# Patient Record
Sex: MALE | Race: OTHER | Employment: OTHER | URBAN - METROPOLITAN AREA
[De-identification: names, ages, dates, MRNs, and addresses within clinical notes are randomized per-mention and may not be internally consistent; named-entity substitution may affect disease eponyms.]

---

## 2017-02-21 ENCOUNTER — LAB ENCOUNTER (OUTPATIENT)
Dept: LAB | Age: 82
End: 2017-02-21
Attending: NURSE PRACTITIONER
Payer: COMMERCIAL

## 2017-02-21 DIAGNOSIS — I10 HTN (HYPERTENSION): ICD-10-CM

## 2017-02-21 DIAGNOSIS — R74.8 ACID PHOSPHATASE ELEVATED: ICD-10-CM

## 2017-02-21 DIAGNOSIS — E11.65 TYPE II DIABETES MELLITUS, UNCONTROLLED (HCC): Primary | ICD-10-CM

## 2017-02-21 LAB
ALBUMIN SERPL-MCNC: 3.8 G/DL (ref 3.5–4.8)
ALP LIVER SERPL-CCNC: 137 U/L (ref 45–117)
ALT SERPL-CCNC: 33 U/L (ref 17–63)
AST SERPL-CCNC: 30 U/L (ref 15–41)
BILIRUB SERPL-MCNC: 0.5 MG/DL (ref 0.1–2)
BUN BLD-MCNC: 42 MG/DL (ref 8–20)
CALCIUM BLD-MCNC: 9.2 MG/DL (ref 8.3–10.3)
CHLORIDE: 106 MMOL/L (ref 101–111)
CO2: 26 MMOL/L (ref 22–32)
CREAT BLD-MCNC: 1.64 MG/DL (ref 0.7–1.3)
EST. AVERAGE GLUCOSE BLD GHB EST-MCNC: 166 MG/DL (ref 68–126)
GLUCOSE BLD-MCNC: 94 MG/DL (ref 70–99)
HBA1C MFR BLD HPLC: 7.4 % (ref ?–5.7)
M PROTEIN MFR SERPL ELPH: 7.7 G/DL (ref 6.1–8.3)
POTASSIUM SERPL-SCNC: 3.9 MMOL/L (ref 3.6–5.1)
SODIUM SERPL-SCNC: 139 MMOL/L (ref 136–144)

## 2017-02-21 PROCEDURE — 80053 COMPREHEN METABOLIC PANEL: CPT

## 2017-02-21 PROCEDURE — 83036 HEMOGLOBIN GLYCOSYLATED A1C: CPT

## 2017-02-21 PROCEDURE — 36415 COLL VENOUS BLD VENIPUNCTURE: CPT

## 2017-03-07 ENCOUNTER — NURSE ONLY (OUTPATIENT)
Dept: ENDOCRINOLOGY CLINIC | Facility: CLINIC | Age: 82
End: 2017-03-07

## 2017-03-07 VITALS
WEIGHT: 188 LBS | HEART RATE: 85 BPM | SYSTOLIC BLOOD PRESSURE: 135 MMHG | BODY MASS INDEX: 32 KG/M2 | DIASTOLIC BLOOD PRESSURE: 72 MMHG

## 2017-03-07 DIAGNOSIS — E11.65 TYPE 2 DIABETES MELLITUS WITH HYPERGLYCEMIA, WITH LONG-TERM CURRENT USE OF INSULIN (HCC): Primary | ICD-10-CM

## 2017-03-07 DIAGNOSIS — Z79.4 TYPE 2 DIABETES MELLITUS WITH HYPERGLYCEMIA, WITH LONG-TERM CURRENT USE OF INSULIN (HCC): Primary | ICD-10-CM

## 2017-03-07 PROCEDURE — G0108 DIAB MANAGE TRN  PER INDIV: HCPCS

## 2017-03-08 NOTE — PROGRESS NOTES
Orlando Daniel  : 5/15/1935 was seen for Diabetic Follow up:     Date: 3/7/2017       Assessment:     Assessment: /72 mmHg  Pulse 85  Wt 188 lb       HEMOGLOBIN A1C (%)   Date Value   2015 >14   ----------  HGBA1C (%)   Date Value   / nutrition concepts for diabetes management. Reviewed (choice, carbohydrate control using the healthy plate method and food models.   Reviewed principles for heart healthy diet (low fate, low saturated fat, high fiber, reduced sodium)  Oth    Being Active

## 2017-04-04 ENCOUNTER — LAB ENCOUNTER (OUTPATIENT)
Dept: LAB | Age: 82
End: 2017-04-04
Attending: NURSE PRACTITIONER
Payer: COMMERCIAL

## 2017-04-04 DIAGNOSIS — E11.9 DIABETES MELLITUS (HCC): ICD-10-CM

## 2017-04-04 DIAGNOSIS — N18.30 CHRONIC KIDNEY DISEASE, STAGE III (MODERATE) (HCC): Primary | ICD-10-CM

## 2017-04-04 PROCEDURE — 36415 COLL VENOUS BLD VENIPUNCTURE: CPT

## 2017-04-04 PROCEDURE — 83036 HEMOGLOBIN GLYCOSYLATED A1C: CPT

## 2017-04-04 PROCEDURE — 80053 COMPREHEN METABOLIC PANEL: CPT

## 2017-06-06 ENCOUNTER — NURSE ONLY (OUTPATIENT)
Dept: ENDOCRINOLOGY CLINIC | Facility: CLINIC | Age: 82
End: 2017-06-06

## 2017-06-06 VITALS
HEART RATE: 83 BPM | BODY MASS INDEX: 32 KG/M2 | WEIGHT: 187 LBS | DIASTOLIC BLOOD PRESSURE: 51 MMHG | SYSTOLIC BLOOD PRESSURE: 89 MMHG

## 2017-06-06 DIAGNOSIS — E11.65 TYPE 2 DIABETES MELLITUS WITH HYPERGLYCEMIA, WITH LONG-TERM CURRENT USE OF INSULIN (HCC): Primary | ICD-10-CM

## 2017-06-06 DIAGNOSIS — Z79.4 TYPE 2 DIABETES MELLITUS WITH HYPERGLYCEMIA, WITH LONG-TERM CURRENT USE OF INSULIN (HCC): Primary | ICD-10-CM

## 2017-06-06 PROCEDURE — G0108 DIAB MANAGE TRN  PER INDIV: HCPCS

## 2017-06-06 NOTE — PROGRESS NOTES
Met with Missael Patricio for a 3 month follow up for his diabetes. He is testing BG levels before every meal.  All readings are within acceptable ranges and his average BG level for the last 2 weeks is 152.   He is injecting 40 units of Lantus insulin at HS and 1

## 2017-11-14 ENCOUNTER — NURSE ONLY (OUTPATIENT)
Dept: ENDOCRINOLOGY CLINIC | Facility: CLINIC | Age: 82
End: 2017-11-14

## 2017-11-14 VITALS
SYSTOLIC BLOOD PRESSURE: 117 MMHG | BODY MASS INDEX: 33.97 KG/M2 | HEIGHT: 64 IN | WEIGHT: 199 LBS | HEART RATE: 89 BPM | DIASTOLIC BLOOD PRESSURE: 64 MMHG

## 2017-11-14 DIAGNOSIS — E11.65 TYPE 2 DIABETES MELLITUS WITH HYPERGLYCEMIA, WITH LONG-TERM CURRENT USE OF INSULIN (HCC): Primary | ICD-10-CM

## 2017-11-14 DIAGNOSIS — Z79.4 TYPE 2 DIABETES MELLITUS WITH HYPERGLYCEMIA, WITH LONG-TERM CURRENT USE OF INSULIN (HCC): Primary | ICD-10-CM

## 2017-11-14 PROCEDURE — G0108 DIAB MANAGE TRN  PER INDIV: HCPCS

## 2017-11-15 NOTE — PROGRESS NOTES
Demarco Chong  : 5/15/1935 was seen for Diabetic Education Follow up:    Date: 2017       Assessment:     Assessment: /64   Pulse 89   Ht 64\"   Wt 199 lb   BMI 34.16 kg/m²        HEMOGLOBIN A1C (%)   Date Value   2015 >14   ------- Bilirubin, Total 0.6 0.1 - 2.0 mg/dL   Total Protein 7.6 6.1 - 8.3 g/dL   Albumin 3.8 3.5 - 4.8 g/dL   Sodium 139 136 - 144 mmol/L   Potassium 3.8 3.6 - 5.1 mmol/L   Chloride 103 101 - 111 mmol/L   CO2 26.0 22.0 - 32.0 mmol/L   -HEMOGLOBIN A1C   Result V

## 2017-11-30 ENCOUNTER — HOSPITAL ENCOUNTER (OUTPATIENT)
Dept: GENERAL RADIOLOGY | Age: 82
Discharge: HOME | End: 2017-11-30
Attending: NURSE PRACTITIONER
Payer: COMMERCIAL

## 2017-11-30 ENCOUNTER — LAB ENCOUNTER (OUTPATIENT)
Dept: LAB | Age: 82
End: 2017-11-30
Attending: NURSE PRACTITIONER
Payer: COMMERCIAL

## 2017-11-30 DIAGNOSIS — I10 ESSENTIAL HYPERTENSION, MALIGNANT: ICD-10-CM

## 2017-11-30 DIAGNOSIS — E11.9 DIABETES MELLITUS (HCC): ICD-10-CM

## 2017-11-30 DIAGNOSIS — M54.9 BACK PAIN: ICD-10-CM

## 2017-11-30 DIAGNOSIS — N18.30 CHRONIC KIDNEY DISEASE, STAGE III (MODERATE) (HCC): Primary | ICD-10-CM

## 2017-11-30 PROCEDURE — 36415 COLL VENOUS BLD VENIPUNCTURE: CPT

## 2017-11-30 PROCEDURE — 83036 HEMOGLOBIN GLYCOSYLATED A1C: CPT

## 2017-11-30 PROCEDURE — 80061 LIPID PANEL: CPT

## 2017-11-30 PROCEDURE — 80053 COMPREHEN METABOLIC PANEL: CPT

## 2017-11-30 PROCEDURE — 72072 X-RAY EXAM THORAC SPINE 3VWS: CPT | Performed by: NURSE PRACTITIONER

## 2017-11-30 PROCEDURE — 72110 X-RAY EXAM L-2 SPINE 4/>VWS: CPT | Performed by: NURSE PRACTITIONER

## 2018-02-20 ENCOUNTER — NURSE ONLY (OUTPATIENT)
Dept: ENDOCRINOLOGY CLINIC | Facility: CLINIC | Age: 83
End: 2018-02-20

## 2018-02-20 VITALS
DIASTOLIC BLOOD PRESSURE: 66 MMHG | BODY MASS INDEX: 32 KG/M2 | WEIGHT: 185 LBS | HEART RATE: 95 BPM | SYSTOLIC BLOOD PRESSURE: 129 MMHG

## 2018-02-20 DIAGNOSIS — E11.65 TYPE 2 DIABETES MELLITUS WITH HYPERGLYCEMIA, WITH LONG-TERM CURRENT USE OF INSULIN (HCC): Primary | ICD-10-CM

## 2018-02-20 DIAGNOSIS — Z79.4 TYPE 2 DIABETES MELLITUS WITH HYPERGLYCEMIA, WITH LONG-TERM CURRENT USE OF INSULIN (HCC): Primary | ICD-10-CM

## 2018-02-20 PROCEDURE — G0108 DIAB MANAGE TRN  PER INDIV: HCPCS

## 2018-02-21 NOTE — PROGRESS NOTES
Chelsiechris Tejada  : 5/15/1935 was seen for Diabetic Education Follow up:    Date: 2018       Assessment:     Assessment: /66   Pulse 95   Wt 185 lb   BMI 31.76 kg/m²        HEMOGLOBIN A1C (%)   Date Value   2015 >14   ----------  HgbA1C visit on 69/75/59  -COMP METABOLIC PANEL (14)   Result Value Ref Range   Glucose 166 (H) 70 - 99 mg/dL   BUN 35 (H) 8 - 20 mg/dL   Creatinine 2.11 (H) 0.70 - 1.30 mg/dL   GFR 28 (L) >=60   Calcium, Total 8.7 8.3 - 10.3 mg/dL   Alkaline Phosphatase 219 (H) complications reviewed including eye, dental, CV health, renal protection, and foot care discussed. [facility] [place] depression and anxiety form given and reviewed. Family involvement/support encouraged  Depression and diabetes reviewed.     HALIMA

## 2018-03-20 ENCOUNTER — NURSE ONLY (OUTPATIENT)
Dept: ENDOCRINOLOGY CLINIC | Facility: CLINIC | Age: 83
End: 2018-03-20

## 2018-03-20 VITALS
HEART RATE: 87 BPM | BODY MASS INDEX: 32 KG/M2 | WEIGHT: 189 LBS | SYSTOLIC BLOOD PRESSURE: 140 MMHG | DIASTOLIC BLOOD PRESSURE: 78 MMHG

## 2018-03-20 DIAGNOSIS — E11.65 TYPE 2 DIABETES MELLITUS WITH HYPERGLYCEMIA, WITH LONG-TERM CURRENT USE OF INSULIN (HCC): Primary | ICD-10-CM

## 2018-03-20 DIAGNOSIS — Z79.4 TYPE 2 DIABETES MELLITUS WITH HYPERGLYCEMIA, WITH LONG-TERM CURRENT USE OF INSULIN (HCC): Primary | ICD-10-CM

## 2018-03-20 LAB
CARTRIDGE LOT#: 815 NUMERIC
HEMOGLOBIN A1C: 14 % (ref 4.3–5.6)

## 2018-03-20 PROCEDURE — 83036 HEMOGLOBIN GLYCOSYLATED A1C: CPT

## 2018-03-20 PROCEDURE — G0108 DIAB MANAGE TRN  PER INDIV: HCPCS

## 2018-03-21 NOTE — PROGRESS NOTES
Met with Gianfranco Basilio and his daughter for a 1 month follow up. He has been testing his BG levels 6 times a day. His BG logs show BG levels that are acceptable, however when the A1C was done it is a 14.   Pt states that he has not changed anything and that he

## 2018-05-09 ENCOUNTER — NURSE ONLY (OUTPATIENT)
Dept: ENDOCRINOLOGY CLINIC | Facility: CLINIC | Age: 83
End: 2018-05-09

## 2018-05-09 VITALS
SYSTOLIC BLOOD PRESSURE: 100 MMHG | WEIGHT: 195 LBS | HEART RATE: 90 BPM | BODY MASS INDEX: 33 KG/M2 | DIASTOLIC BLOOD PRESSURE: 53 MMHG

## 2018-05-09 DIAGNOSIS — E11.65 TYPE 2 DIABETES MELLITUS WITH HYPERGLYCEMIA, WITH LONG-TERM CURRENT USE OF INSULIN (HCC): Primary | ICD-10-CM

## 2018-05-09 DIAGNOSIS — Z79.4 TYPE 2 DIABETES MELLITUS WITH HYPERGLYCEMIA, WITH LONG-TERM CURRENT USE OF INSULIN (HCC): Primary | ICD-10-CM

## 2018-05-09 PROCEDURE — G0108 DIAB MANAGE TRN  PER INDIV: HCPCS

## 2018-05-11 NOTE — PROGRESS NOTES
Met with Pt. And daughter for a follow up visit. Pt still bringing in BG logs with questionable readings. I spoke to both Pt and daughter and He states that he does test and writes down all the results as they appear on his meter.   Daughter states that s

## 2018-05-22 ENCOUNTER — NURSE ONLY (OUTPATIENT)
Dept: ENDOCRINOLOGY CLINIC | Facility: CLINIC | Age: 83
End: 2018-05-22

## 2018-05-22 DIAGNOSIS — E11.65 TYPE 2 DIABETES MELLITUS WITH HYPERGLYCEMIA, WITH LONG-TERM CURRENT USE OF INSULIN (HCC): Primary | ICD-10-CM

## 2018-05-22 DIAGNOSIS — Z79.4 TYPE 2 DIABETES MELLITUS WITH HYPERGLYCEMIA, WITH LONG-TERM CURRENT USE OF INSULIN (HCC): Primary | ICD-10-CM

## 2018-05-22 PROCEDURE — G0108 DIAB MANAGE TRN  PER INDIV: HCPCS

## 2018-05-23 VITALS
SYSTOLIC BLOOD PRESSURE: 110 MMHG | WEIGHT: 195 LBS | BODY MASS INDEX: 33 KG/M2 | DIASTOLIC BLOOD PRESSURE: 64 MMHG | HEART RATE: 64 BPM

## 2018-05-23 NOTE — PROGRESS NOTES
Chelsiechris Tejada  : 5/15/1935 was seen for Diabetic Education Follow up:    Date: 2018     Start time: 7:00p End time: 8:pm    Assessment:     Assessment: /64   Pulse 64   Wt 195 lb   BMI 33.47 kg/m²        HEMOGLOBIN A1C (%)   Date Value   0 and target BG levels. Discussed benefits of BG control and self-management options. Reviewed management of (hyperglycemia, hypoglycemia, sick day)  Hypoglycemia S&S, treatment, and when to call provider reviewed using the rule of 15's.       Results for o verbalized understanding and has no further questions at this time.     Ankur Muhammad, RN, CDE

## 2018-05-28 ENCOUNTER — APPOINTMENT (OUTPATIENT)
Dept: CT IMAGING | Facility: HOSPITAL | Age: 83
DRG: 871 | End: 2018-05-28
Attending: EMERGENCY MEDICINE
Payer: COMMERCIAL

## 2018-05-28 ENCOUNTER — APPOINTMENT (OUTPATIENT)
Dept: GENERAL RADIOLOGY | Facility: HOSPITAL | Age: 83
DRG: 871 | End: 2018-05-28
Payer: COMMERCIAL

## 2018-05-28 ENCOUNTER — APPOINTMENT (OUTPATIENT)
Dept: NUCLEAR MEDICINE | Facility: HOSPITAL | Age: 83
DRG: 871 | End: 2018-05-28
Attending: STUDENT IN AN ORGANIZED HEALTH CARE EDUCATION/TRAINING PROGRAM
Payer: COMMERCIAL

## 2018-05-28 ENCOUNTER — APPOINTMENT (OUTPATIENT)
Dept: GENERAL RADIOLOGY | Facility: HOSPITAL | Age: 83
DRG: 871 | End: 2018-05-28
Attending: STUDENT IN AN ORGANIZED HEALTH CARE EDUCATION/TRAINING PROGRAM
Payer: COMMERCIAL

## 2018-05-28 ENCOUNTER — HOSPITAL ENCOUNTER (INPATIENT)
Facility: HOSPITAL | Age: 83
LOS: 6 days | Discharge: HOME HEALTH | DRG: 871 | End: 2018-06-03
Attending: EMERGENCY MEDICINE | Admitting: STUDENT IN AN ORGANIZED HEALTH CARE EDUCATION/TRAINING PROGRAM
Payer: COMMERCIAL

## 2018-05-28 ENCOUNTER — APPOINTMENT (OUTPATIENT)
Dept: ULTRASOUND IMAGING | Facility: HOSPITAL | Age: 83
DRG: 871 | End: 2018-05-28
Attending: INTERNAL MEDICINE
Payer: COMMERCIAL

## 2018-05-28 DIAGNOSIS — S00.81XA ABRASION OF FACE, INITIAL ENCOUNTER: ICD-10-CM

## 2018-05-28 DIAGNOSIS — R77.8 TROPONIN I ABOVE REFERENCE RANGE: ICD-10-CM

## 2018-05-28 DIAGNOSIS — E11.65 TYPE 2 DIABETES MELLITUS WITH HYPERGLYCEMIA, WITH LONG-TERM CURRENT USE OF INSULIN (HCC): ICD-10-CM

## 2018-05-28 DIAGNOSIS — A41.9 SEPSIS DUE TO UNDETERMINED ORGANISM (HCC): Primary | ICD-10-CM

## 2018-05-28 DIAGNOSIS — D72.829 LEUKOCYTOSIS, UNSPECIFIED TYPE: ICD-10-CM

## 2018-05-28 DIAGNOSIS — Z79.4 TYPE 2 DIABETES MELLITUS WITH HYPERGLYCEMIA, WITH LONG-TERM CURRENT USE OF INSULIN (HCC): ICD-10-CM

## 2018-05-28 DIAGNOSIS — S80.219A ABRASION OF KNEE, UNSPECIFIED LATERALITY, INITIAL ENCOUNTER: ICD-10-CM

## 2018-05-28 DIAGNOSIS — N17.9 ACUTE RENAL FAILURE, UNSPECIFIED ACUTE RENAL FAILURE TYPE (HCC): ICD-10-CM

## 2018-05-28 DIAGNOSIS — R55 SYNCOPE AND COLLAPSE: ICD-10-CM

## 2018-05-28 PROBLEM — R79.89 TROPONIN I ABOVE REFERENCE RANGE: Status: ACTIVE | Noted: 2018-05-28

## 2018-05-28 PROCEDURE — 99223 1ST HOSP IP/OBS HIGH 75: CPT | Performed by: STUDENT IN AN ORGANIZED HEALTH CARE EDUCATION/TRAINING PROGRAM

## 2018-05-28 PROCEDURE — 73503 X-RAY EXAM HIP UNI 4/> VIEWS: CPT | Performed by: STUDENT IN AN ORGANIZED HEALTH CARE EDUCATION/TRAINING PROGRAM

## 2018-05-28 PROCEDURE — 78582 LUNG VENTILAT&PERFUS IMAGING: CPT | Performed by: STUDENT IN AN ORGANIZED HEALTH CARE EDUCATION/TRAINING PROGRAM

## 2018-05-28 PROCEDURE — 70450 CT HEAD/BRAIN W/O DYE: CPT | Performed by: EMERGENCY MEDICINE

## 2018-05-28 PROCEDURE — 93970 EXTREMITY STUDY: CPT | Performed by: INTERNAL MEDICINE

## 2018-05-28 PROCEDURE — 71045 X-RAY EXAM CHEST 1 VIEW: CPT | Performed by: EMERGENCY MEDICINE

## 2018-05-28 RX ORDER — IPRATROPIUM BROMIDE AND ALBUTEROL SULFATE 2.5; .5 MG/3ML; MG/3ML
3 SOLUTION RESPIRATORY (INHALATION) ONCE
Status: COMPLETED | OUTPATIENT
Start: 2018-05-28 | End: 2018-05-28

## 2018-05-28 RX ORDER — DEXTROSE MONOHYDRATE 25 G/50ML
50 INJECTION, SOLUTION INTRAVENOUS
Status: DISCONTINUED | OUTPATIENT
Start: 2018-05-28 | End: 2018-06-03

## 2018-05-28 RX ORDER — SODIUM CHLORIDE 9 MG/ML
INJECTION, SOLUTION INTRAVENOUS CONTINUOUS
Status: DISCONTINUED | OUTPATIENT
Start: 2018-05-28 | End: 2018-05-30

## 2018-05-28 RX ORDER — ENOXAPARIN SODIUM 100 MG/ML
40 INJECTION SUBCUTANEOUS DAILY
Status: DISCONTINUED | OUTPATIENT
Start: 2018-05-28 | End: 2018-05-28

## 2018-05-28 RX ORDER — HEPARIN SODIUM 5000 [USP'U]/ML
5000 INJECTION, SOLUTION INTRAVENOUS; SUBCUTANEOUS EVERY 12 HOURS SCHEDULED
Status: DISCONTINUED | OUTPATIENT
Start: 2018-05-28 | End: 2018-06-03

## 2018-05-28 RX ORDER — HYDROCODONE BITARTRATE AND ACETAMINOPHEN 5; 325 MG/1; MG/1
1 TABLET ORAL EVERY 4 HOURS PRN
Status: DISCONTINUED | OUTPATIENT
Start: 2018-05-28 | End: 2018-06-01

## 2018-05-28 RX ORDER — ONDANSETRON 2 MG/ML
4 INJECTION INTRAMUSCULAR; INTRAVENOUS EVERY 6 HOURS PRN
Status: DISCONTINUED | OUTPATIENT
Start: 2018-05-28 | End: 2018-06-03

## 2018-05-28 RX ORDER — HYDROCODONE BITARTRATE AND ACETAMINOPHEN 5; 325 MG/1; MG/1
2 TABLET ORAL EVERY 4 HOURS PRN
Status: DISCONTINUED | OUTPATIENT
Start: 2018-05-28 | End: 2018-06-01

## 2018-05-28 RX ORDER — SODIUM CHLORIDE 9 MG/ML
INJECTION, SOLUTION INTRAVENOUS CONTINUOUS
Status: DISCONTINUED | OUTPATIENT
Start: 2018-05-28 | End: 2018-05-28

## 2018-05-28 NOTE — ED NOTES
Pt appears more comfortable on cart, resps appear less labored than upon arrival. Pt to B0 at this time.

## 2018-05-28 NOTE — PROGRESS NOTES
[facility] Pharmacy Note:  Renal Adjustment for piperacillin/tazobactam (Pieter Aldrich)    Yolie Ortiz is a 81year old male who has been prescribed piperacillin/tazobactam (ZOSYN) 3.375 g every 8 hrs.   CrCl is estimated creatinine clearance is 18.7 mL/min (A) (base

## 2018-05-28 NOTE — H&P
[facility]  History and Physical     Danidaniel Shrestha Patient Status:  Inpatient    5/15/1935 MRN [id number]   [facility] 4SW-A Attending Nico Anders MD   Hosp Day # 0 PCP Adeline Miller     Chief Complaint: Weakness, Chest pr Syringe-Needle U-100 (RELION INSULIN SYRINGE) 31G X 5/16\" 0.5 ML Does not apply Misc 3 injections per day with Novolog insulin Disp: 100 each Rfl: 5   Diclofenac-Misoprostol 75-0.2 MG Oral Tab EC Take 1 tablet by mouth daily.  Disp:  Rfl:    Chlorthalidone Creatinine Clearance: 18.7 mL/min (A) (based on SCr of 2.35 mg/dL (H)).     Recent Labs   Lab  05/28/18   1430   PTP  14.6   INR  1.10       Recent Labs   Lab  05/28/18   1430  05/28/18   1657   TROP  0.171*  0.211*   CK   --   3,189*       Imaging: Imaging

## 2018-05-28 NOTE — ED PROVIDER NOTES
Patient Seen in: [facility] Emergency Department    History   Patient presents with:  Syncope (cardiovascular, neurologic)  Hyperglycemia (metabolic)  Fall (musculoskeletal, neurologic)    Stated Complaint: syncope, hyperglycemia, fall    HPI    83- Packs/day: 0.00      Years: 20.00        Types: Cigarettes     Quit date: 11/10/1986  Smokeless tobacco: Never Used                      Comment: 1 pack a week  Alcohol use:  No                Review of Systems   All other systems reviewed and are negati Psychiatric: He has a normal mood and affect. Nursing note and vitals reviewed.            ED Course     Labs Reviewed   COMP METABOLIC PANEL (14) - Abnormal; Notable for the following:        Result Value    Glucose 435 (*)     BUN 34 (*)     Creatinine D-Dimer results of less than 0.5 ug/mL (FEU) have been shown to contribute to the exclusion of venous thromboembolism with a negative predictive value of approximately 95% when results are used as part of the total clinical evaluation of the patient.    POC PROCEDURE:  CT BRAIN OR HEAD (20008)  COMPARISON:  [facility] BRAIN W/O CONTRAST, 2/21/2007, 12:08. INDICATIONS:  head pain or injury  TECHNIQUE:  Noncontrast CT scanning is performed through the brain. Dose reduction techniques were used.  Dose information Patient had IV established and blood work obtained. He is given a liter fluid. Patient was very warm to touch and patient's white count came back very elevated. This coupled with his high blood sugars there is concern the possibility of sepsis.   No laura Sepsis due to undetermined organism ([facility] [address]  (primary encounter diagnosis)  Type 2 diabetes mellitus with hyperglycemia, with long-term current use of insulin (HCC)  Troponin I above reference range  Syncope and collapse  Abrasion of face, initial encounter

## 2018-05-28 NOTE — ED INITIAL ASSESSMENT (HPI)
Patient presents following an unwitnessed syncopal episode at home. He has been more fatigued lately per family and his blood sugar was in the 500s. He also did not put his nitroglycerin patch on this morning because he forgot.  He has difficulty breathing

## 2018-05-29 ENCOUNTER — APPOINTMENT (OUTPATIENT)
Dept: CT IMAGING | Facility: HOSPITAL | Age: 83
DRG: 871 | End: 2018-05-29
Attending: INTERNAL MEDICINE
Payer: COMMERCIAL

## 2018-05-29 ENCOUNTER — APPOINTMENT (OUTPATIENT)
Dept: CV DIAGNOSTICS | Facility: HOSPITAL | Age: 83
DRG: 871 | End: 2018-05-29
Attending: STUDENT IN AN ORGANIZED HEALTH CARE EDUCATION/TRAINING PROGRAM
Payer: COMMERCIAL

## 2018-05-29 ENCOUNTER — APPOINTMENT (OUTPATIENT)
Dept: GENERAL RADIOLOGY | Facility: HOSPITAL | Age: 83
DRG: 871 | End: 2018-05-29
Attending: STUDENT IN AN ORGANIZED HEALTH CARE EDUCATION/TRAINING PROGRAM
Payer: COMMERCIAL

## 2018-05-29 PROCEDURE — 93306 TTE W/DOPPLER COMPLETE: CPT | Performed by: STUDENT IN AN ORGANIZED HEALTH CARE EDUCATION/TRAINING PROGRAM

## 2018-05-29 PROCEDURE — 99232 SBSQ HOSP IP/OBS MODERATE 35: CPT | Performed by: INTERNAL MEDICINE

## 2018-05-29 PROCEDURE — 71045 X-RAY EXAM CHEST 1 VIEW: CPT | Performed by: STUDENT IN AN ORGANIZED HEALTH CARE EDUCATION/TRAINING PROGRAM

## 2018-05-29 PROCEDURE — 73700 CT LOWER EXTREMITY W/O DYE: CPT | Performed by: INTERNAL MEDICINE

## 2018-05-29 RX ORDER — IPRATROPIUM BROMIDE AND ALBUTEROL SULFATE 2.5; .5 MG/3ML; MG/3ML
3 SOLUTION RESPIRATORY (INHALATION)
Status: DISCONTINUED | OUTPATIENT
Start: 2018-05-29 | End: 2018-05-30

## 2018-05-29 RX ORDER — IPRATROPIUM BROMIDE AND ALBUTEROL SULFATE 2.5; .5 MG/3ML; MG/3ML
3 SOLUTION RESPIRATORY (INHALATION) EVERY 4 HOURS PRN
Status: DISCONTINUED | OUTPATIENT
Start: 2018-05-29 | End: 2018-05-30

## 2018-05-29 RX ORDER — POTASSIUM CHLORIDE 20 MEQ/1
40 TABLET, EXTENDED RELEASE ORAL EVERY 4 HOURS
Status: COMPLETED | OUTPATIENT
Start: 2018-05-29 | End: 2018-05-29

## 2018-05-29 RX ORDER — HYDROMORPHONE HYDROCHLORIDE 1 MG/ML
0.5 INJECTION, SOLUTION INTRAMUSCULAR; INTRAVENOUS; SUBCUTANEOUS EVERY 2 HOUR PRN
Status: DISCONTINUED | OUTPATIENT
Start: 2018-05-29 | End: 2018-06-01

## 2018-05-29 RX ORDER — ATORVASTATIN CALCIUM 40 MG/1
40 TABLET, FILM COATED ORAL NIGHTLY
Status: DISCONTINUED | OUTPATIENT
Start: 2018-05-29 | End: 2018-06-03

## 2018-05-29 RX ORDER — HYDROMORPHONE HYDROCHLORIDE 1 MG/ML
1 INJECTION, SOLUTION INTRAMUSCULAR; INTRAVENOUS; SUBCUTANEOUS EVERY 2 HOUR PRN
Status: DISCONTINUED | OUTPATIENT
Start: 2018-05-29 | End: 2018-06-01

## 2018-05-29 NOTE — PROGRESS NOTES
05/29/18 1342   Clinical Encounter Type   Visited With Health care provider;Patient   Referral To (Ricky Tom made a referral to Macedonian speaking  as requested. )

## 2018-05-29 NOTE — PROGRESS NOTES
[facility] dosing service    Initial Pharmacokinetic Consult for Vancomycin Dosing     Lorie Kitchen is a 81year old male who is being treated for sepsis - possible pneumonia vs cellulitis.   Pharmacy has been asked to dose Vancomycin by Dr. Diane Hairston 15-20 ug/mL. 3.  Pharmacy will need Scr daily while on Vancomycin to assess renal function. 4.  Pharmacy will follow and monitor renal function changes, toxicity and efficacy. Pharmacy will continue to follow him.   We appreciate the opportunity to

## 2018-05-29 NOTE — PLAN OF CARE
Pt admitted to 473 from ED. tachypneic at rest 2 L nasal cannula. Sinus tach on tele with pacs, sbp 160s. Blood sugar 256 high dose insulin sliding scale ordered. Photographs taken of BLE. Swelling LLE> RLE. Plan for echo, stat VQ scan xray hip.  Droplet is

## 2018-05-29 NOTE — CONSULTS
Critical Care Consult     Assessment / Plan:  1. Sepsis - concern for cellulitis vs PNA  - IVFs  - lactic acid normalized  - abx as below  2. ID - cellulitis vs PNA  - pain out of proportion on exam of LLE.  Check CT LLE to evaluated for necrotizing fasciit per day with Novolog insulin Disp: 100 each Rfl: 5 5/28/2018 at Unknown time   Chlorthalidone 25 MG Oral Tab Take 25 mg by mouth daily. Disp:  Rfl:  5/28/2018 at Unknown time   Atorvastatin Calcium (LIPITOR) 80 MG Oral Tab Take 80 mg by mouth daily.  Disp: activity: Not on file     Other Topics Concern   None on file     Social History Narrative    ** Merged History Encounter **           No family history on file.       Exam:   05/29/18  0400 05/29/18  0500 05/29/18  0600 05/29/18  0700   BP: 125/57 95/45  1

## 2018-05-29 NOTE — PROGRESS NOTES
[facility]  Progress Note     Amanda Andrade Patient Status:  Inpatient    5/15/1935 MRN [id number]   [facility] 4SW-A Attending Wen Merrill MD   Hosp Day # 1 PCP ABIGAIL GRANDA     Chief Complaint:  Weakness, fever, mech fall 28.2 mL/min (A) (based on SCr of 1.56 mg/dL (H)).     Recent Labs   Lab  05/28/18   1430   PTP  14.6   INR  1.10       Recent Labs   Lab  05/28/18   1657  05/29/18   0002  05/29/18   0626   TROP  0.211*  0.316*  0.218*  0.218*   CK  3,189*  5,514*  6,795* tbd  Discharge is dependent on: clinical course  At this point Mr. Doshi Grief is expected to be discharge to: tbd    Plan of care discussed with RN, pt will try to reach Gilford Seltzer, NP,   5/29/2018  [id number]    Addendum:    Pt seen and examined

## 2018-05-29 NOTE — PAYOR COMM NOTE
--------------  ADMISSION REVIEW     Payor: Abel Duarte #:  [id number]  Authorization Number: N/A    Admit date: 5/28/18  Admit time: 1       Admitting Physician: Nilson Mandel MD  Attending Physician:  Stu Carmona MD  Primary Care y - Abnormal; Notable for the following:        Result Value    Glucose 435 (*)     BUN 34 (*)     Creatinine 2.35 (*)     GFR, Non- 25 (*)     GFR, -American 29 (*)     Alkaline Phosphatase 180 (*)     AST 70 (*)     Total Protein 8.4 other components within normal limits   POCT GLUCOSE - Abnormal; Notable for the following:     POC Glucose 256 (*)     All other components within normal limits   CBC W/ DIFFERENTIAL - Abnormal; Notable for the following:     WBC 23.4 (*)     Neutrophil A with hyperglycemia, with long-term current use of insulin ([facility])  Troponin I above reference range  Syncope and collapse  Abrasion of face, initial encounter  Abrasion of knee, unspecified laterality, initial encounter  Acute renal failure, unspecified acut ipratropium-albuterol (DUONEB) nebulizer solution 3 mL     Date Action Dose Route User    5/28/2018 1526 Given 3 mL Nebulization Iraida Lynn      ipratropium-albuterol (DUONEB) nebulizer solution 3 mL     Date Action Dose Route User    5/29/2018 08 pending  Will check follow up ekg, cardiac enzymes and telemetry  Await echocardiogram.   Eventually when recovered, would ultimately obtain a lexiiscan nuclear stress test but not right now. Continue supportive care.   1. Sepsis - concern for cellulitis

## 2018-05-29 NOTE — PLAN OF CARE
CARDIOVASCULAR - ADULT    • Maintains optimal cardiac output and hemodynamic stability Progressing          METABOLIC/FLUID AND ELECTROLYTES - ADULT    • Glucose maintained within prescribed range Progressing        . Assumed pt care at 0730.   aa/

## 2018-05-29 NOTE — PROGRESS NOTES
ICU  Critical Care APRN Progress Note    NAME: Venu Wan - ROOM: [address] [place] [address] - MRN: [id number] - Age: 81year old - :5/15/1935    History Of Present Illness:  Venu Wan is a 81year old male with PMHx significant for HTN, DM, and hypertriglyc or unspecified type diabetes mellitus without mention of complication, not stated as uncontrolled    • Unspecified essential hypertension      Social Hx:  Smoking status: Former Smoker                                                              Packs/day: brain. Dose reduction techniques were used. Dose information is transmitted to the [facility]) NRDR ([address]) which includes the Dose Index Registry.   PATIENT STATED HISTORY: (As transcribed by Technologist) Unilateral min 4 views of the hip and pelvis if performed. COMPARISON:  None. INDICATIONS:  LT HIP PAIN  PATIENT STATED HISTORY: (As transcribed by Technologist)  Lt hip pain. No known injury. FINDINGS: No evidence of fracture or dislocation.  Mild to m worsen  -Monitor urine for changes in color, quantity, consistancy    5. Transaminates--chronic but increased and mild abd tenderness  -Follow LFTS  -Consider Abdominal US if worsening    6.   DM--poorly controlled  -Accuchecks  -Novolog sliding scale    F

## 2018-05-29 NOTE — CONSULTS
[facility]  Report of Consultation    Mike Nielsen Patient Status:  Inpatient    5/15/1935 MRN [id number]   [facility] 4SW-A Attending Pb Brock MD   Hosp Day # 0 PCP Ford Gross     Reason for Consultation: elevated tropo Medications:  HYDROcodone-acetaminophen (NORCO) 5-325 MG per tab 1 tablet 1 tablet Oral Q4H PRN   Or      HYDROcodone-acetaminophen (NORCO) 5-325 MG per tab 2 tablet 2 tablet Oral Q4H PRN   ondansetron HCl (ZOFRAN) injection 4 mg 4 mg Intravenous Q6H PRN Extremities:  edema  Neurologic: Alert and oriented, normal affect. Skin: Warm and dry.      Laboratories and Data:    Labs:     Lab Results  Component Value Date   WBC 23.4 05/28/2018   HGB 14.5 05/28/2018   HCT 42.6 05/28/2018   .0 05/28/2018 supportive care. Thanks.        Oliver Astudillo MD  5/28/2018  9:11 PM

## 2018-05-29 NOTE — PROGRESS NOTES
[facility] Cardiology Progress Note    Patient seen and examined.  Chart reviewed.  Discussed with RN. No chest pain or shortness of breath.     /54   Pulse 81   Temp 99.2 °F (37.3 °C) (Temporal)   Resp 23   Ht 5' 2\" (1.575 m)   Wt 190 lb 4.1 oz (86.3 kg

## 2018-05-29 NOTE — PLAN OF CARE
Diabetes/Glucose Control    • Glucose maintained within prescribed range Not Progressing          SKIN/TISSUE INTEGRITY - ADULT    • Incision(s), wounds(s) or drain site(s) healing without S/S of infection Not Progressing        Report received from previo

## 2018-05-29 NOTE — PROGRESS NOTES
05/29/18 1445   Clinical Encounter Type   Visited With Patient and family together   Continue Visiting No   Crisis Visit Critical care   Referral From   ([address] speaker)   Referral To    Faith Encounters   Faith Needs Prayer   S

## 2018-05-30 ENCOUNTER — APPOINTMENT (OUTPATIENT)
Dept: GENERAL RADIOLOGY | Facility: HOSPITAL | Age: 83
DRG: 871 | End: 2018-05-30
Attending: INTERNAL MEDICINE
Payer: COMMERCIAL

## 2018-05-30 PROCEDURE — 71045 X-RAY EXAM CHEST 1 VIEW: CPT | Performed by: INTERNAL MEDICINE

## 2018-05-30 PROCEDURE — 99239 HOSP IP/OBS DSCHRG MGMT >30: CPT | Performed by: INTERNAL MEDICINE

## 2018-05-30 RX ORDER — FUROSEMIDE 10 MG/ML
40 INJECTION INTRAMUSCULAR; INTRAVENOUS ONCE
Status: COMPLETED | OUTPATIENT
Start: 2018-05-30 | End: 2018-05-30

## 2018-05-30 RX ORDER — POTASSIUM CHLORIDE 20 MEQ/1
40 TABLET, EXTENDED RELEASE ORAL ONCE
Status: COMPLETED | OUTPATIENT
Start: 2018-05-30 | End: 2018-05-30

## 2018-05-30 RX ORDER — FUROSEMIDE 10 MG/ML
20 INJECTION INTRAMUSCULAR; INTRAVENOUS ONCE
Status: COMPLETED | OUTPATIENT
Start: 2018-05-30 | End: 2018-05-30

## 2018-05-30 RX ORDER — IPRATROPIUM BROMIDE AND ALBUTEROL SULFATE 2.5; .5 MG/3ML; MG/3ML
3 SOLUTION RESPIRATORY (INHALATION) EVERY 4 HOURS PRN
Status: DISCONTINUED | OUTPATIENT
Start: 2018-05-30 | End: 2018-06-03

## 2018-05-30 NOTE — PLAN OF CARE
Pt is AOx4, Palestinian speaking only. Per son and daughters the patient is alert and oriented tonight. They said he was confused last night. Up with assist of 1. Has trouble walking because of pain to his left lower leg. Room air. SR with a few pacs.   Q

## 2018-05-30 NOTE — PROGRESS NOTES
Pt transferred to room 5631 with nurse escort and on tele monitor. Room air, hemodynamically stable during transfer. Family present at bedside. Belongings sent with pt. Report given to Aaron.

## 2018-05-30 NOTE — PHYSICAL THERAPY NOTE
PHYSICAL THERAPY EVALUATION - INPATIENT     Room Number: 2886/3303-F  Evaluation Date: 5/30/2018  Type of Evaluation: Initial  Physician Order: PT Eval and Treat    Presenting Problem: Sepsis  Reason for Therapy: Mobility Dysfunction and Discharge Planni encounter    Abrasion of knee, unspecified laterality, initial encounter    Acute renal failure, unspecified acute renal failure type (HCC)    Leukocytosis, unspecified type    Elevated troponin    DIMA (acute kidney injury) [facility])      Past Medical History decreased awareness of need for safety  · Awareness of Deficits:  fully aware of deficits    RANGE OF MOTION AND STRENGTH ASSESSMENT  Upper extremity ROM and strength are within functional limits     Lower extremity ROM is within functional limits     Allison López Min A. Pt ambulated 60 feet with use of a RW and verbal cues to maintain body within RW base of support. Pt completed stand>sit to chair with CGA and verbal cues for sequencing and RW management.     Exercise/Education Provided:  Bed mobility  Body mechan re-educate;Strengthening;Stair training;Balance training;Transfer training  Rehab Potential : Good  Frequency (Obs): 5x/week  Number of Visits to Meet Established Goals: 5      CURRENT GOALS    Goal #1 Patient is able to demonstrate supine - sit EOB @ leve

## 2018-05-30 NOTE — OCCUPATIONAL THERAPY NOTE
OCCUPATIONAL THERAPY QUICK EVALUATION - INPATIENT    Room Number: 5428/0448-P  Evaluation Date: 5/30/2018     Type of Evaluation: Quick Eval  Presenting Problem: Sepsis due to undetermined organism    Physician Order: IP Consult to Occupational Therapy  Re Impression   CONCLUSION: No evidence of DVT in bilateral lower extremities.               Dictated by: Fernando Jones MD on 5/28/2018 at 22:15       Approved by: MD Kia Franklin MD Physician Signed   ED Provider Notes History and Physical            Pallavi Dave Patient Status:  Inpatient    5/15/1935 MRN [id number]   [facility] 4SW-A Attending Nakul Roque MD   Hosp Day # 0 PCP [facility]      Chief Complaint: Weakness, Chest pressure      Hi Other Equipment: Other (Comment) (RW)    Occupation/Status: retired  Hand Dominance: Right  Drives: No  Patient Regularly Uses: Glasses    Prior Level of Function: I/Mod I with all ADL's     SUBJECTIVE   \"I have pain\"    Patient self-stated goal is to go Skilled Therapy Provided: Pt presents supine in bed. IPAD  utilized and phone call to daughter and son to assist in obtaining subjective information.    Pt education on Role of OT, POC, D/C plan,   Therapist completed assessment of current funct Overall Complexity  MODERATE     OT Discharge Recommendations: Home with home health PT/OT  OT Device Recommendations: None    PLAN   Patient has been evaluated and presents with no skilled Occupational Therapy needs at this time.   Patient discharged from

## 2018-05-30 NOTE — PROGRESS NOTES
Pulmonary Progress Note        NAME: Shelbi Wagner - ROOM: 5306/4948-I - MRN: [id number] - Age: 81year old - : 5/15/1935        SUBJECTIVE: No events overnight, still noting significant pain in left lower extremity, feels that breathing is a little b 13.5 12.7*   MCV 94.0 94.6 97.0 98.0   .0 218.0 191.0 172.0   BAND 18  --   --   --    INR 1.10  --   --   --          Recent Labs   05/28/18  1430 05/29/18  0626 05/29/18  1930 05/30/18  0453   * 138  --  137   K 3.9 3.4* 4.1 3.8   CL 99* 104 biggest barrier to discharge      [address] [facility] Pulmonary and Critical Care

## 2018-05-30 NOTE — PROGRESS NOTES
[facility] Pharmacy Note:  Renal Adjustment for piperacillin/tazobactam (Chidi Botello)    David Pan is a 81year old male who has been prescribed piperacillin/tazobactam (ZOSYN) 3.375 gm every 12 hrs.   CrCl is estimated creatinine clearance is 31.2 mL/min (A) (ba

## 2018-05-30 NOTE — PROGRESS NOTES
[facility]  Progress Note     Tammy Amador Patient Status:  Inpatient    5/15/1935 MRN [id number]   [facility] 4SW-A Attending Akua Baxter MD   Hosp Day # 2 PCP ABIGAIL GRANDA     Chief Complaint:  Weakness, fever, mech fall 4.1  3.8   CL  99*  104   --   110   CO2  21.0*  23.0   --   20.0*   ALKPHO  180*  128*   --    --    AST  70*  148*   --    --    ALT  38  37   --    --    BILT  1.3  1.3   --    --    TP  8.4*  6.9   --    --        Estimated Creatinine Clearance: 31.2 m w/u as outpt - will check arterial US legs due to dm, neuropathy   7. ? Syncopal episode- may be from dehydration/ sepsis/ MI  1. CT head reviewed   2.   pt/ot evals-  Home w/ pt therapy       Quality:  · DVT Prophylaxis: Heparin   · CODE status: full  ·  F

## 2018-05-30 NOTE — PROGRESS NOTES
[facility]  Cardiology Progress Note    Subjective:  No chest pain or shortness of breath. Still with left leg pain (acute on chronic issue).     Objective:  /62 (BP Location: Left arm)   Pulse 89   Temp 98.6 °F (37 °C) (Oral)   Resp 20   Ht 5' AM      =======================================================  Patient seen and examined independently. Note reviewed and labs reviewed. Agree with above assessment and plan.   Volume overloaded due to treatment for sepsis and will reduce IVF and give I

## 2018-05-30 NOTE — HOME CARE LIAISON
Referral received for home health. [facility] is not contracted with patient's insurance. Re-referred to Melly Voss who is [place].   Able to accept for [place] PT visits  Discussed with patient in [address] [facility] the service and care coordinated wi

## 2018-05-31 ENCOUNTER — APPOINTMENT (OUTPATIENT)
Dept: ULTRASOUND IMAGING | Facility: HOSPITAL | Age: 83
DRG: 871 | End: 2018-05-31
Attending: NURSE PRACTITIONER
Payer: COMMERCIAL

## 2018-05-31 PROCEDURE — 93923 UPR/LXTR ART STDY 3+ LVLS: CPT | Performed by: NURSE PRACTITIONER

## 2018-05-31 PROCEDURE — 99232 SBSQ HOSP IP/OBS MODERATE 35: CPT | Performed by: INTERNAL MEDICINE

## 2018-05-31 RX ORDER — POTASSIUM CHLORIDE 20 MEQ/1
40 TABLET, EXTENDED RELEASE ORAL EVERY 4 HOURS
Status: COMPLETED | OUTPATIENT
Start: 2018-05-31 | End: 2018-05-31

## 2018-05-31 RX ORDER — FUROSEMIDE 40 MG/1
40 TABLET ORAL
Status: DISCONTINUED | OUTPATIENT
Start: 2018-05-31 | End: 2018-06-03

## 2018-05-31 RX ORDER — FUROSEMIDE 10 MG/ML
40 INJECTION INTRAMUSCULAR; INTRAVENOUS
Status: DISCONTINUED | OUTPATIENT
Start: 2018-05-31 | End: 2018-05-31

## 2018-05-31 NOTE — PHYSICAL THERAPY NOTE
4503: Attempted to see Pt this AM - RN Aury aware of attempt. Spoke to Pt in [address] - Pt reported high level of pain, and not willing to ambulate at this time. 1512: second attempt - Pt currently off floor at doppler.       Will f/u later today if t

## 2018-05-31 NOTE — PROGRESS NOTES
[facility]  Progress Note     Alber Glance Patient Status:  Inpatient    5/15/1935 MRN [id number]   [facility] 4SW-A Attending Lorena Herman MD   Hosp Day # 3 PCP ABIGAIL GRANDA     Chief Complaint:  Weakness, fever, mech fall 148*   --    --    --    ALT  38  37   --    --    --    BILT  1.3  1.3   --    --    --    TP  8.4*  6.9   --    --    --        Estimated Creatinine Clearance: 29.3 mL/min (A) (based on SCr of 1.5 mg/dL (H)).     Recent Labs   Lab  05/28/18   1430   PTP outpt - will check arterial US legs due to dm, neuropathy   Pain still issue, arterial US pending, Norco not seeming to help   10. ?  Syncopal episode- may be from dehydration  1.  pt/ot evals-  Home w/ pt therapy       Quality:  · DVT Prophylaxis: Heparin

## 2018-05-31 NOTE — PROGRESS NOTES
Pulmonary Progress Note      NAME: Paco Kruse - ROOM: [facility]8799-O - MRN: [id number] - Age: 81year old - : 5/15/1935    Assessment/Plan:  1. ID - cellulitis vs less likely PNA.  Markedly elevated PCT  -CT LLE showed osteoarthritis and bobbi knee joint RBC  3.96   HGB  12.7*   HCT  38.8   MCV  98.0   MCH  32.1   MCHC  32.7   RDW  12.2   NEPRELIM  15.13*   WBC  17.3*   PLT  172.0     Recent Labs   Lab  05/28/18   1430  05/29/18   0626  05/29/18   1930  05/30/18   0453  05/31/18   0452   GLU  435*  210*

## 2018-05-31 NOTE — PROGRESS NOTES
[facility]  Cardiology Progress Note    Subjective:  R sided chest pain - muskuloskeletal - reproduced with palpation.   LE swelling persistent    Objective:  /54 (BP Location: Right arm)   Pulse 80   Temp 98.5 °F (36.9 °C) (Oral)   Resp 16   Ht

## 2018-06-01 PROCEDURE — 99232 SBSQ HOSP IP/OBS MODERATE 35: CPT | Performed by: INTERNAL MEDICINE

## 2018-06-01 RX ORDER — HYDROMORPHONE HYDROCHLORIDE 1 MG/ML
0.5 INJECTION, SOLUTION INTRAMUSCULAR; INTRAVENOUS; SUBCUTANEOUS EVERY 4 HOURS PRN
Status: DISCONTINUED | OUTPATIENT
Start: 2018-06-01 | End: 2018-06-03

## 2018-06-01 RX ORDER — ACETAMINOPHEN AND CODEINE PHOSPHATE 300; 30 MG/1; MG/1
1 TABLET ORAL EVERY 4 HOURS PRN
Status: DISCONTINUED | OUTPATIENT
Start: 2018-06-01 | End: 2018-06-03

## 2018-06-01 RX ORDER — POTASSIUM CHLORIDE 20 MEQ/1
40 TABLET, EXTENDED RELEASE ORAL EVERY 4 HOURS
Status: COMPLETED | OUTPATIENT
Start: 2018-06-01 | End: 2018-06-01

## 2018-06-01 NOTE — PROGRESS NOTES
[facility]  Progress Note     Pierre Ramirez Patient Status:  Inpatient    5/15/1935 MRN [id number]   [facility] 4SW-A Attending Octavio Mora MD   Hosp Day # 4 PCP ABIGAIL GRANDA     Chief Complaint:  Weakness, fever, mech fall --   139   K  3.9  3.4*   < >  3.8  3.0*  3.0*  4.1  3.4*   CL  99*  104   --   110  106   --   103   CO2  21.0*  23.0   --   20.0*  26.0   --   27.0   ALKPHO  180*  128*   --    --    --    --    --    AST  70*  148*   --    --    --    --    --    ALT  3 fall/contusion  5. Elevated troponin - Not ACS ; outpt stress in future, trop flat   1. h/o CAD  2. Echo  Preserved LVEF, grade 1 dysfunction   3. Cards following   6. 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 DM type 2, with hyperglycemia  1. ISS  2.  A1c 9

## 2018-06-01 NOTE — PHYSICAL THERAPY NOTE
PHYSICAL THERAPY TREATMENT NOTE - INPATIENT    Room Number: 7107/4898-L     Session: 1   Number of Visits to Meet Established Goals: 5    Presenting Problem: Sepsis    Problem List  Principal Problem:    Sepsis due to undetermined organism ([facility]  Active P Sitting down on and standing up from a chair with arms (e.g., wheelchair, bedside commode, etc.): A Little   -   Moving from lying on back to sitting on the side of the bed?: A Little   How much help from another person does the patient currently need. .. Discharge Recommendations: Home with home health PT/OT     PLAN  PT Treatment Plan: Bed mobility; Endurance; Family education;Patient education;Gait training;Neuromuscular re-educate;Strengthening;Stair training;Balance training;Transfer training  [facility]

## 2018-06-01 NOTE — PROGRESS NOTES
Pulmonary Progress Note        NAME: Laney Floor - ROOM: 0187/8589-T - MRN: [id number] - Age: 81year old - : 5/15/1935        SUBJECTIVE: No events overnight, still noting severe pain in left leg    OBJECTIVE:   18  1539 18  1950  137 139  --  139   K 4.1 3.8 3.0*  3.0* 4.1 3.4*   CL  --  110 106  --  103   CO2  --  20.0* 26.0  --  27.0   BUN  --  27* 24*  --  26*   CA  --  7.6* 7.9*  --  8.5       No results for input(s): ALT, AST, ALB, AMYLASE, LIPASE, LDH in the last 72 hours.

## 2018-06-01 NOTE — PROGRESS NOTES
[facility]  Cardiology Progress Note    Subjective:  No chest pain or shortness of breath.     Objective:  /72   Pulse 73   Temp 98 °F (36.7 °C) (Oral)   Resp 18   Ht 5' 2\" (1.575 m)   Wt 190 lb 11.2 oz (86.5 kg)   SpO2 92%   BMI 34.88 kg/m²

## 2018-06-02 PROCEDURE — 99231 SBSQ HOSP IP/OBS SF/LOW 25: CPT | Performed by: HOSPITALIST

## 2018-06-02 RX ORDER — FUROSEMIDE 10 MG/ML
40 INJECTION INTRAMUSCULAR; INTRAVENOUS ONCE
Status: COMPLETED | OUTPATIENT
Start: 2018-06-02 | End: 2018-06-02

## 2018-06-02 RX ORDER — FUROSEMIDE 40 MG/1
40 TABLET ORAL
Qty: 60 TABLET | Refills: 3 | Status: SHIPPED | OUTPATIENT
Start: 2018-06-02 | End: 2019-05-13 | Stop reason: CLARIF

## 2018-06-02 RX ORDER — AMOXICILLIN AND CLAVULANATE POTASSIUM 875; 125 MG/1; MG/1
1 TABLET, FILM COATED ORAL 2 TIMES DAILY
Qty: 10 TABLET | Refills: 0 | Status: ON HOLD | OUTPATIENT
Start: 2018-06-02 | End: 2018-06-07

## 2018-06-02 RX ORDER — GLIPIZIDE 5 MG/1
2.5 TABLET ORAL
Qty: 30 TABLET | Refills: 0 | Status: ON HOLD | OUTPATIENT
Start: 2018-06-02 | End: 2018-06-08

## 2018-06-02 RX ORDER — ACETAMINOPHEN AND CODEINE PHOSPHATE 300; 30 MG/1; MG/1
1 TABLET ORAL EVERY 4 HOURS PRN
Qty: 20 TABLET | Refills: 0 | Status: ON HOLD | OUTPATIENT
Start: 2018-06-02 | End: 2018-06-07

## 2018-06-02 NOTE — CM/SW NOTE
Spoke with primary rn, anticipate d/c today; Merged with [facility] notified via page of anticipated d/c.     Heidy Sanders RN,   Phone [phone]  Pager [phone]

## 2018-06-02 NOTE — PROGRESS NOTES
[facility]  Progress Note     Shelbi Wagner Patient Status:  Inpatient    5/15/1935 MRN [id number]   [facility] 4SW-A Attending Des Fuentes MD   Hosp Day # 5 PCP ABIGAIL GRANDA     Chief Complaint:  Weakness, fever, mech fall 3.9  3.4*   < >  3.8  3.0*  3.0*  4.1  3.4*  4.1   CL  99*  104   --   110  106   --   103   --    CO2  21.0*  23.0   --   20.0*  26.0   --   27.0   --    ALKPHO  180*  128*   --    --    --    --    --    --    AST  70*  148*   --    --    --    --    -- fall/contusion  8. Lactic acidosis, resolved  9. Acute on chronic left leg pain- multifactorial with underlying OA/ neuropathy/ PVD in addition to recent fall  10. ?  Syncopal episode- may be from dehydration, resolved     Quality:  · DVT Prophylaxis: Hepar

## 2018-06-02 NOTE — DISCHARGE SUMMARY
[facility] [place] HOSPITALIST  DISCHARGE SUMMARY     Venu Wan Patient Status:  Inpatient    5/15/1935 MRN [id number]   [facility] 8NE-A Attending Gab Pelayo MD   Hosp Day # 6 PCP Ann Singletary     Date of Admission: 2018  Date of D Katlin Westbrook is a 81year old male with  h/o CAD, HTN, DM uncontrolled, pt was brought to the hospital due to malaise and weakness. It appears the pt has been feeling unwell for past week- has been weak, poor PO intake .  Pt reports he may have passed out earlie LASIX      Take 1 tablet (40 mg total) by mouth BID (Diuretic).    Quantity:  60 tablet  Refills:  3     glipiZIDE 5 MG Tabs  Commonly known as:  GLUCOTROL      Take 0.5 tablets (2.5 mg total) by mouth every morning before breakfast.   Quantity:  30 tablet appointment as directed by your PCP    Naila Albarado  [address] [facility] [address] [phone]    In 1 week      David Quinteros MD  [address] [facility] [address]   [phone]    In 4 weeks        This note is linke

## 2018-06-02 NOTE — PLAN OF CARE
Patient is alert and oriented. On room air, NSR on the tele. NSR on the tele. Pain controlled with oral medication see EMAR. Ambulates with a walker. IV lasix x one today then resume oral. Possible discharge later.  POC updated with patient and family, all

## 2018-06-03 VITALS
WEIGHT: 183.63 LBS | HEART RATE: 89 BPM | OXYGEN SATURATION: 94 % | RESPIRATION RATE: 20 BRPM | TEMPERATURE: 98 F | HEIGHT: 62 IN | DIASTOLIC BLOOD PRESSURE: 71 MMHG | BODY MASS INDEX: 33.79 KG/M2 | SYSTOLIC BLOOD PRESSURE: 121 MMHG

## 2018-06-03 PROCEDURE — 99239 HOSP IP/OBS DSCHRG MGMT >30: CPT | Performed by: HOSPITALIST

## 2018-06-03 RX ORDER — FUROSEMIDE 10 MG/ML
40 INJECTION INTRAMUSCULAR; INTRAVENOUS ONCE
Status: COMPLETED | OUTPATIENT
Start: 2018-06-03 | End: 2018-06-03

## 2018-06-03 NOTE — PROGRESS NOTES
[facility]ITAL[facility]  Progress Note     Markdoe Lagos Patient Status:  Inpatient    5/15/1935 MRN [id number]   [facility] 4SW-A Attending Debbi Cee MD   Hosp Day # 6 PCP ABIGAIL GRANDA     Chief Complaint:  Weakness, fever, mech fall 23.0   --   20.0*  26.0   --   27.0   --    ALKPHO  180*  128*   --    --    --    --    --    --    AST  70*  148*   --    --    --    --    --    --    ALT  38  37   --    --    --    --    --    --    BILT  1.3  1.3   --    --    --    --    --    -- underlying OA/ neuropathy/ PVD in addition to recent fall  10. Possible syncopal episode- may be from dehydration, resolved     Quality:  · DVT Prophylaxis: Heparin     Home today, discussed with patient and RN.     Jeffrey TILLMAN

## 2018-06-03 NOTE — PLAN OF CARE
Patient is able to be discharged. IV was taken out, tele was taken off and returned. Patient belonging were collected. Discharge instructions were given to the patient and daughter in detail. All questions answered.  Patient and daughter verbalized Sum[place]

## 2018-06-05 ENCOUNTER — HOSPITAL ENCOUNTER (INPATIENT)
Facility: HOSPITAL | Age: 83
LOS: 4 days | Discharge: HOME HEALTH | DRG: 602 | End: 2018-06-11
Admitting: HOSPITALIST
Payer: COMMERCIAL

## 2018-06-05 ENCOUNTER — APPOINTMENT (OUTPATIENT)
Dept: CT IMAGING | Facility: HOSPITAL | Age: 83
DRG: 602 | End: 2018-06-05
Payer: COMMERCIAL

## 2018-06-05 ENCOUNTER — APPOINTMENT (OUTPATIENT)
Dept: GENERAL RADIOLOGY | Facility: HOSPITAL | Age: 83
DRG: 602 | End: 2018-06-05
Attending: EMERGENCY MEDICINE
Payer: COMMERCIAL

## 2018-06-05 ENCOUNTER — APPOINTMENT (OUTPATIENT)
Dept: ULTRASOUND IMAGING | Facility: HOSPITAL | Age: 83
DRG: 602 | End: 2018-06-05
Payer: COMMERCIAL

## 2018-06-05 DIAGNOSIS — L03.90 CELLULITIS, UNSPECIFIED CELLULITIS SITE: ICD-10-CM

## 2018-06-05 DIAGNOSIS — R41.82 ALTERED MENTAL STATUS, UNSPECIFIED ALTERED MENTAL STATUS TYPE: Primary | ICD-10-CM

## 2018-06-05 PROCEDURE — 70450 CT HEAD/BRAIN W/O DYE: CPT

## 2018-06-05 PROCEDURE — 71045 X-RAY EXAM CHEST 1 VIEW: CPT | Performed by: EMERGENCY MEDICINE

## 2018-06-05 PROCEDURE — 93971 EXTREMITY STUDY: CPT

## 2018-06-05 PROCEDURE — 99223 1ST HOSP IP/OBS HIGH 75: CPT | Performed by: HOSPITALIST

## 2018-06-05 RX ORDER — HALOPERIDOL 5 MG/ML
5 INJECTION INTRAMUSCULAR ONCE
Status: DISCONTINUED | OUTPATIENT
Start: 2018-06-05 | End: 2018-06-11

## 2018-06-05 RX ORDER — CEFAZOLIN SODIUM/WATER 2 G/20 ML
2 SYRINGE (ML) INTRAVENOUS ONCE
Status: COMPLETED | OUTPATIENT
Start: 2018-06-05 | End: 2018-06-05

## 2018-06-05 RX ORDER — DEXTROSE MONOHYDRATE 25 G/50ML
50 INJECTION, SOLUTION INTRAVENOUS ONCE
Status: COMPLETED | OUTPATIENT
Start: 2018-06-05 | End: 2018-06-06

## 2018-06-05 RX ORDER — HYDROMORPHONE HYDROCHLORIDE 1 MG/ML
1 INJECTION, SOLUTION INTRAMUSCULAR; INTRAVENOUS; SUBCUTANEOUS ONCE
Status: COMPLETED | OUTPATIENT
Start: 2018-06-05 | End: 2018-06-05

## 2018-06-05 RX ORDER — SODIUM CHLORIDE 9 MG/ML
1000 INJECTION, SOLUTION INTRAVENOUS ONCE
Status: COMPLETED | OUTPATIENT
Start: 2018-06-05 | End: 2018-06-06

## 2018-06-06 PROBLEM — L03.90 CELLULITIS, UNSPECIFIED CELLULITIS SITE: Status: ACTIVE | Noted: 2018-06-06

## 2018-06-06 PROBLEM — R41.82 ALTERED MENTAL STATUS, UNSPECIFIED ALTERED MENTAL STATUS TYPE: Status: ACTIVE | Noted: 2018-06-06

## 2018-06-06 PROCEDURE — 99232 SBSQ HOSP IP/OBS MODERATE 35: CPT | Performed by: HOSPITALIST

## 2018-06-06 RX ORDER — NITROGLYCERIN 80 MG/1
1 PATCH TRANSDERMAL DAILY
Status: DISCONTINUED | OUTPATIENT
Start: 2018-06-06 | End: 2018-06-11

## 2018-06-06 RX ORDER — ENALAPRIL MALEATE 10 MG/1
20 TABLET ORAL 2 TIMES DAILY
Status: DISCONTINUED | OUTPATIENT
Start: 2018-06-06 | End: 2018-06-11

## 2018-06-06 RX ORDER — ENOXAPARIN SODIUM 100 MG/ML
40 INJECTION SUBCUTANEOUS DAILY
Status: DISCONTINUED | OUTPATIENT
Start: 2018-06-06 | End: 2018-06-11

## 2018-06-06 RX ORDER — TRAZODONE HYDROCHLORIDE 50 MG/1
50 TABLET ORAL NIGHTLY PRN
Status: DISCONTINUED | OUTPATIENT
Start: 2018-06-06 | End: 2018-06-11

## 2018-06-06 RX ORDER — CEFAZOLIN SODIUM 1 G/50ML
1 INJECTION, SOLUTION INTRAVENOUS EVERY 12 HOURS
Status: DISCONTINUED | OUTPATIENT
Start: 2018-06-06 | End: 2018-06-08

## 2018-06-06 RX ORDER — HALOPERIDOL 5 MG/ML
2 INJECTION INTRAMUSCULAR EVERY 2 HOUR PRN
Status: DISCONTINUED | OUTPATIENT
Start: 2018-06-06 | End: 2018-06-11

## 2018-06-06 RX ORDER — KETOROLAC TROMETHAMINE 15 MG/ML
15 INJECTION, SOLUTION INTRAMUSCULAR; INTRAVENOUS EVERY 6 HOURS PRN
Status: DISPENSED | OUTPATIENT
Start: 2018-06-06 | End: 2018-06-08

## 2018-06-06 RX ORDER — ENOXAPARIN SODIUM 100 MG/ML
40 INJECTION SUBCUTANEOUS DAILY
Status: DISCONTINUED | OUTPATIENT
Start: 2018-06-06 | End: 2018-06-06

## 2018-06-06 RX ORDER — ACETAMINOPHEN 325 MG/1
650 TABLET ORAL EVERY 6 HOURS PRN
Status: DISCONTINUED | OUTPATIENT
Start: 2018-06-06 | End: 2018-06-11

## 2018-06-06 RX ORDER — ONDANSETRON 2 MG/ML
4 INJECTION INTRAMUSCULAR; INTRAVENOUS EVERY 4 HOURS PRN
Status: DISCONTINUED | OUTPATIENT
Start: 2018-06-06 | End: 2018-06-11

## 2018-06-06 RX ORDER — ASPIRIN 81 MG/1
81 TABLET, CHEWABLE ORAL DAILY
Status: DISCONTINUED | OUTPATIENT
Start: 2018-06-06 | End: 2018-06-11

## 2018-06-06 RX ORDER — HYDROMORPHONE HYDROCHLORIDE 1 MG/ML
0.5 INJECTION, SOLUTION INTRAMUSCULAR; INTRAVENOUS; SUBCUTANEOUS EVERY 30 MIN PRN
Status: ACTIVE | OUTPATIENT
Start: 2018-06-06 | End: 2018-06-06

## 2018-06-06 RX ORDER — CEFAZOLIN SODIUM/WATER 2 G/20 ML
2 SYRINGE (ML) INTRAVENOUS EVERY 12 HOURS
Status: DISCONTINUED | OUTPATIENT
Start: 2018-06-06 | End: 2018-06-06

## 2018-06-06 RX ORDER — SODIUM CHLORIDE 9 MG/ML
INJECTION, SOLUTION INTRAVENOUS CONTINUOUS
Status: ACTIVE | OUTPATIENT
Start: 2018-06-06 | End: 2018-06-06

## 2018-06-06 RX ORDER — DEXTROSE MONOHYDRATE 25 G/50ML
50 INJECTION, SOLUTION INTRAVENOUS
Status: DISCONTINUED | OUTPATIENT
Start: 2018-06-06 | End: 2018-06-11

## 2018-06-06 RX ORDER — ATORVASTATIN CALCIUM 80 MG/1
80 TABLET, FILM COATED ORAL DAILY
Status: DISCONTINUED | OUTPATIENT
Start: 2018-06-06 | End: 2018-06-11

## 2018-06-06 NOTE — HOME CARE LIAISON
Patient is pending with [address] for PT probono services. Will continue to follow and will need new orders.   Thanks

## 2018-06-06 NOTE — PROGRESS NOTES
Pharmacy Note:   Antimicrobial Weight Dose Adjustment for: Cefazolin    Shelbi Wagner has been prescribed Cefazolin 2 gram IV every 12h. Estimated Creatinine Clearance: 34.1 mL/min (based on SCr of 1.29 mg/dL).           Weight = 83.1 kg

## 2018-06-06 NOTE — ED NOTES
Pt noted to get restless, & frustrated while trying to void. Assisted back to bed, unable to do xray. Pain meds given, calm down post pain meds.  Transferred to room near nurses station

## 2018-06-06 NOTE — PROGRESS NOTES
Pharmacy Note: Renal dose adjustment of Ancef    Paco Kruse is a 81year old male who has been prescribed Ancef. CrCl is 30.1 ml/min so the dose has been adjusted  to 1gm IV q12h per hospital renal dose adjustment protocol.   Pharmacy will follow and

## 2018-06-06 NOTE — PROGRESS NOTES
[facility] Pharmacy Note: Antimicrobial Weight Dose Adjustment for: vancomycin (Lars Hammond)    Travis Tejada is a 81year old male who has been prescribed vancomycin (VANCOCIN) 1000 mg x1.   CrCl is estimated creatinine clearance is 27.8 mL/min (A) (based on SCr

## 2018-06-06 NOTE — PROGRESS NOTES
Pt received via cart from er. Pt is confused, family at bedside, pt only speaks Nicaraguan, family helping with admission navigator. Bed in low and locked positiion  Call light within reach.

## 2018-06-06 NOTE — ED INITIAL ASSESSMENT (HPI)
Per family pt started to have confusion, restlessness since 8 pm. Able to move extremities w/o difficulty. Recently dc from hospital due to hyperglycemia & LLE infection.

## 2018-06-06 NOTE — PROGRESS NOTES
Multidisciplinary Discharge Rounds held 6/6/2018. Treatment team members present today include , , Charge Nurse,  Nurse, RT, PT and Pharmacy caring for [employer]      Other care providers present:    Patient Active Proble

## 2018-06-06 NOTE — PAYOR COMM NOTE
--------------  ADMISSION REVIEW     Payor: Víctor oHang #:  [id number]  Authorization Number: N/A    Admit date: 6/6/18  Admit time: 0130       Admitting Physician: Lula Gilbert MD  Attending Physician:  Evans Vallecillo DO  Primary Care y Oral  SpO2: 92 %  O2 Device: None (Room air)    Current:/88 (BP Location: Right arm)   Pulse 93   Temp 97.6 °F (36.4 °C) (Oral)   Resp 18   Ht 157.5 cm (5' 2\")   Wt 83.1 kg   SpO2 91%   BMI 33.53 kg/m²          Physical Exam    GENERAL APPEARANCE: Urine 4.0 (*)     All other components within normal limits   POCT GLUCOSE - Abnormal; Notable for the following:     POC Glucose 137 (*)     All other components within normal limits   POCT GLUCOSE - Abnormal; Notable for the following:     POC Glucose 46 confusion. Was d/c'd from [place] on 6/3 for cellulitis but now comes back with confusion today per family. Has been agitatated and moving about, moving his left leg a lot. No fevers per family but patient hasn't eaten much today.   They are not sure if h 53*  49*   --   46*   --    GFRNAA  46*  43*   --   40*   --    CA  7.6*  7.9*   --   8.5   --    NA  137  139   --   139   --    K  3.8  3.0*  3.0*  4.1  3.4*  4.1   CL  110  106   --   103   --    CO2  20.0*  26.0   --   27.0   --        Estimated Creat Action Dose Route User    6/5/2018 2306 Given 2 g Intravenous Nanette Adams RN      ceFAZolin sodium (ANCEF/KEFZOL) 2 GM/20ML premix IV syringe 2 g     Date Action Dose Route User    6/6/2018 1031 Given 2 g Intravenous Geroldine WALT Burgess

## 2018-06-06 NOTE — H&P
[facility]ITAL[facility]  History and Physical     Tammy Neither Patient Status:  Emergency    5/15/1935 MRN [id number]   Location [address] [facility] Attending Miesha Hartmann MD   Hosp Day # 0 PCP Mich Hines     Chief Complaint: c Oral Tab Take 1 tablet (40 mg total) by mouth BID (Diuretic). Disp: 60 tablet Rfl: 3   MetFORMIN HCl (GLUCOPHAGE) 1000 MG Oral Tab Take 1,000 mg by mouth 2 (two) times daily with meals.  Disp:  Rfl:    Insulin Syringe-Needle U-100 (RELION INSULIN SYRINGE) 3 Labs:  Recent Labs   Lab  05/30/18   0453  06/01/18   0443  06/05/18   2152   WBC  17.3*  11.4  9.2   HGB  12.7*  13.2  13.6   MCV  98.0  94.7  92.3   PLT  172.0  239.0  416.0       Recent Labs   Lab  05/30/18   0453  05/31/18   0452  05/31/18   1941

## 2018-06-06 NOTE — PROGRESS NOTES
[facility]ITAL[facility]  Progress Note     Stanfield Hext Patient Status:  Inpatient    5/15/1935 MRN [id number]   [facility] 5NW-A Attending Vicente Plata, [address] [facility] Day # 0 PCP Gypsy Grant     Chief Complaint: Confusion    S: Patient see 06/05/18 2152   TROP   --   <0.046   CK  2,822*  207            Imaging: Imaging data reviewed in Epic.     Medications:   • nitroGLYCERIN  1 patch Transdermal Daily   • Enalapril Maleate  20 mg Oral BID   • atorvastatin  80 mg Oral Daily   • Insulin Aspa

## 2018-06-06 NOTE — PHYSICAL THERAPY NOTE
PHYSICAL THERAPY QUICK EVALUATION - INPATIENT    Room Number: 146/231-P  Evaluation Date: 6/6/2018  Presenting Problem: AMS  Physician Order: PT Eval and Treat    Problem List  Principal Problem:    Altered mental status, unspecified altered mental statu difficulty does the patient currently have. ..  -   Turning over in bed (including adjusting bedclothes, sheets and blankets)?: None   -   Sitting down on and standing up from a chair with arms (e.g., wheelchair, bedside commode, etc.): A Little   -   Movin mobility. Research supports that patients with this level of impairment may benefit from [facility] services. .  Based on this evaluation, patient's clinical presentation is evolving and overall evaluation complexity is considered moderate.   PT Discharge Recommen

## 2018-06-06 NOTE — ED PROVIDER NOTES
Patient Seen in: [facility] Emergency Department    History   Patient presents with:  Altered Mental Status (neurologic)    Stated Complaint: ams    HPI    Male presenting with family from home by private vehicle with worsening over the last 24 hour (Room air)    Current:/88 (BP Location: Right arm)   Pulse 93   Temp 97.6 °F (36.4 °C) (Oral)   Resp 18   Ht 157.5 cm (5' 2\")   Wt 83.1 kg   SpO2 91%   BMI 33.53 kg/m²         Physical Exam    GENERAL APPEARANCE:     Well developed, well nourished, components within normal limits   POCT GLUCOSE - Abnormal; Notable for the following:     POC Glucose 137 (*)     All other components within normal limits   POCT GLUCOSE - Abnormal; Notable for the following:     POC Glucose 46 (*)     All other component PM         Patient has been seen in the emergency department by Dr. Michael Mukherjee for admission.       Disposition and Plan     Clinical Impression:  Altered mental status, unspecified altered mental status type  (primary encounter diagnosis)  Cellulitis, u

## 2018-06-06 NOTE — CM/SW NOTE
06/06/18 1500   CM/SW Referral Data   Referral Source Social Work (self-referral);    Reason for Referral Discharge planning;Readmission   Informant Patient; Children   Readmission Assessment   Factors that patient feels contributed to this re / to remain available for support and/or discharge planning.

## 2018-06-07 PROCEDURE — 99232 SBSQ HOSP IP/OBS MODERATE 35: CPT | Performed by: HOSPITALIST

## 2018-06-07 RX ORDER — CEPHALEXIN 500 MG/1
500 CAPSULE ORAL 3 TIMES DAILY
Qty: 21 CAPSULE | Refills: 0 | Status: SHIPPED | OUTPATIENT
Start: 2018-06-07 | End: 2018-06-09

## 2018-06-07 NOTE — PROGRESS NOTES
[facility]  Progress Note     Demarco Chong Patient Status:  Inpatient    5/15/1935 MRN [id number]   [facility] 5NW-A Attending Arturo Miller, [address] [facility] Day # 1 PCP ABIGAIL GRANDA     Chief Complaint: Confusion    S: Patient see TROP  <0.046   CK  207            Imaging: Imaging data reviewed in Epic.     Medications:   • nitroGLYCERIN  1 patch Transdermal Daily   • Enalapril Maleate  20 mg Oral BID   • atorvastatin  80 mg Oral Daily   • Insulin Aspart Pen  1-5 Units Subcutaneous

## 2018-06-07 NOTE — PROGRESS NOTES
Multidisciplinary Discharge Rounds held 6/7/2018. Treatment team members present today include , , Charge Nurse,  Nurse, RT, PT and Pharmacy caring for [employer]      Other care providers present:    Patient Active Proble

## 2018-06-07 NOTE — HOME CARE LIAISON
Notified by  Paco Patel, son Cirilo Bartlett would like to speak with me. He left me a voicemail on Tuesday. I returned his call on Wednesday and also tried to reach him today without success.   I called daughter Kirk Cabrera who helped to coordinate last visit an

## 2018-06-07 NOTE — PROGRESS NOTES
06/07/18 1101   Clinical Encounter Type   Visited With Patient   Sacramental Encounters   Sacrament of Sick-Anointing Anointed   The patient was seen by Maximus De La Cruz. Received prayer, Scripture, support and Sacrament of the Sick.  Chaplain downey

## 2018-06-07 NOTE — PROGRESS NOTES
Daughter at bedside. Family had many questions regarding insulin/diabetic medications and checking blood sugar. Daughter thinks pt is taking both diabetic pills, Lantus, and Novolog.  RN instructed daughter to talk to her brother regarding correct medicatio

## 2018-06-07 NOTE — PAYOR COMM NOTE
--------------  CONTINUED STAY REVIEW    Payor: Víctor Hoang #:  [id number]  Authorization Number: N/A    Admit date: 6/6/18  Admit time: 0130    Admitting Physician: Lula Gilbert MD  Attending Physician:  Evans Vallecillo DO  Primary Care Ph 6/7/2018 0022 New Bag 1 g Intravenous Vickie Foote, WALT      Enalapril Maleate (VASOTEC) tab 20 mg     Date Action Dose Route User    6/7/2018 1012 Given 20 mg Oral Pearl Dennis RN    6/6/2018 2121 Given 20 mg Oral Ele Delaney [place]

## 2018-06-07 NOTE — HOME CARE LIAISON
I spoke with Ayaka Berg regarding his concerns for home health. Explained that visits are pro alejo, initial request for PT asking for additional approval for RN due to readmission, authorization is pending for skilled nursing but approved for PT.   Explained t

## 2018-06-08 PROCEDURE — 99232 SBSQ HOSP IP/OBS MODERATE 35: CPT | Performed by: HOSPITALIST

## 2018-06-08 RX ORDER — TRAMADOL HYDROCHLORIDE 50 MG/1
50 TABLET ORAL EVERY 6 HOURS PRN
Status: DISCONTINUED | OUTPATIENT
Start: 2018-06-08 | End: 2018-06-11

## 2018-06-08 RX ORDER — CEFAZOLIN SODIUM/WATER 2 G/20 ML
2 SYRINGE (ML) INTRAVENOUS EVERY 8 HOURS
Status: DISCONTINUED | OUTPATIENT
Start: 2018-06-08 | End: 2018-06-11

## 2018-06-08 RX ORDER — GLIPIZIDE 5 MG/1
5 TABLET ORAL
Qty: 30 TABLET | Refills: 0 | Status: SHIPPED | OUTPATIENT
Start: 2018-06-08 | End: 2018-06-11

## 2018-06-08 NOTE — DIETARY NOTE
Nutrition Short Note    Noted pt hemoglobin A1C 9.7. Discussed DM guidelines with dtr, Andreia Li. All questions answered. Provided handouts on carbohydrate counting/label reading in [place] and Nauruan -left in pts room. RD available PRN.    Abeba Moffett RD,

## 2018-06-08 NOTE — PROGRESS NOTES
[facility]  Progress Note     Renzoelena Tayler Patient Status:  Inpatient    5/15/1935 MRN [id number]   [facility] 5NW-A Attending Pankaj Rodriguez, [address] [facility] Day # 1 PCP ABIGAIL GRANDA     Chief Complaint: Confusion    S: Patient see Transdermal Daily   • Enalapril Maleate  20 mg Oral BID   • atorvastatin  80 mg Oral Daily   • Insulin Aspart Pen  1-5 Units Subcutaneous TID CC and HS   • aspirin  81 mg Oral Daily   • enoxaparin  40 mg Subcutaneous Daily   • haloperidol lactate  5 mg Int

## 2018-06-08 NOTE — HOME CARE LIAISON
Clarified with [facility], per Lyla Rouse, patient was \"referred to [facility] clinic by [facility] and patient is already established as a patient. My colleague was given wrong information by yamila Palomino previously.   Per Mino Geronimo has no more resourc

## 2018-06-08 NOTE — CONSULTS
INFECTIOUS DISEASE [address] [facility] [facility] Patient Status:  Observation    5/15/1935 MRN [id number]   [facility] 5NW-A Attending Cassidy Paredes, [address] [facility] Day # 1 PCP ABIGAIL [place] (DEX4) oral liquid 30 g, 30 g, Oral, Q15 Min PRN **OR** Glucose-Vitamin C (DEX-4) 4-0.006 g chewable tab 8 tablet, 8 tablet, Oral, Q15 Min PRN  •  acetaminophen (TYLENOL) tab 650 mg, 650 mg, Oral, Q6H PRN  •  Insulin Aspart Pen (NOVOLOG) 100 UNIT/ML flexpe 1. 29   GFRAA   --   51*  59*   GFRNAA   --   44*  51*   CA   --   8.4  8.3   ALB   --   2.5*   --    NA   --   135*  135*   K  4.1  3.5*  4.4   CL   --   98*  101   CO2   --   26.0  27.0   ALKPHO   --   364*   --    AST   --   80*   --    ALT   --   67*

## 2018-06-08 NOTE — PLAN OF CARE
Impaired Functional Mobility    • Achieve highest/safest level of mobility/gait Progressing        METABOLIC/FLUID AND ELECTROLYTES - ADULT    • Glucose maintained within prescribed range Progressing    • Electrolytes maintained within normal limits Progre left lower extremity     CKD stage 3 due to type 2 diabetes mellitus (HCC)     Altered mental status     Altered mental status, unspecified altered mental status type     Cellulitis, unspecified cellulitis site     Cerebrovascular disease     Obesity (BMI

## 2018-06-09 PROCEDURE — 99232 SBSQ HOSP IP/OBS MODERATE 35: CPT | Performed by: HOSPITALIST

## 2018-06-09 RX ORDER — CEPHALEXIN 500 MG/1
500 CAPSULE ORAL 3 TIMES DAILY
Qty: 90 CAPSULE | Refills: 0 | Status: SHIPPED | OUTPATIENT
Start: 2018-06-09 | End: 2018-06-11

## 2018-06-09 NOTE — PROGRESS NOTES
[facility]                INFECTIOUS DISEASE PROGRESS NOTE    Jose Eduardo Hurley Patient Status:  Inpatient    5/15/1935 MRN [id number]   [facility] 5NW-A Attending Dominic Couch, [address] [facility] Day # 2 PCP ABIGAIL GRANDA     Antibiotic Abrasion of face, initial encounter     Abrasion of knee, unspecified laterality, initial encounter     Acute renal failure, unspecified acute renal failure type (HCC)     Leukocytosis, unspecified type     Elevated troponin     DIMA (acute kidney injury) (

## 2018-06-09 NOTE — PROGRESS NOTES
[facility]  Progress Note     Fuentes Yoder Patient Status:  Inpatient    5/15/1935 MRN [id number]   [facility] 5NW-A Attending Mitesh Mejia, [address] [facility] Day # 2 PCP Clara Jones     Chief Complaint: Confusion    S: Patient see Enalapril Maleate  20 mg Oral BID   • atorvastatin  80 mg Oral Daily   • aspirin  81 mg Oral Daily   • enoxaparin  40 mg Subcutaneous Daily   • haloperidol lactate  5 mg Intravenous Once       ASSESSMENT / PLAN:     1. LLE cellulitis  1. Abx per ID  2.  Rosetta Lizama

## 2018-06-10 PROCEDURE — 99232 SBSQ HOSP IP/OBS MODERATE 35: CPT | Performed by: HOSPITALIST

## 2018-06-10 NOTE — PROGRESS NOTES
[facility]  Progress Note     Demarco Chong Patient Status:  Inpatient    5/15/1935 MRN [id number]   [facility] 5NW-A Attending Arturo Miller, [address] [facility] Day # 3 PCP ABIGAIL GRANDA     Chief Complaint: Confusion    S: Patient see atorvastatin  80 mg Oral Daily   • aspirin  81 mg Oral Daily   • enoxaparin  40 mg Subcutaneous Daily   • haloperidol lactate  5 mg Intravenous Once       ASSESSMENT / PLAN:     1. LLE cellulitis  1. Abx per ID  2. Follow cultures - negative so far  3.  Ult

## 2018-06-11 VITALS
WEIGHT: 183.31 LBS | HEART RATE: 71 BPM | SYSTOLIC BLOOD PRESSURE: 133 MMHG | DIASTOLIC BLOOD PRESSURE: 78 MMHG | HEIGHT: 62 IN | RESPIRATION RATE: 16 BRPM | BODY MASS INDEX: 33.73 KG/M2 | TEMPERATURE: 99 F | OXYGEN SATURATION: 98 %

## 2018-06-11 PROCEDURE — 99239 HOSP IP/OBS DSCHRG MGMT >30: CPT | Performed by: HOSPITALIST

## 2018-06-11 RX ORDER — GLIPIZIDE 5 MG/1
5 TABLET ORAL
Qty: 30 TABLET | Refills: 0 | Status: SHIPPED | OUTPATIENT
Start: 2018-06-11 | End: 2018-07-11

## 2018-06-11 RX ORDER — INSULIN ASPART 100 [IU]/ML
20 INJECTION, SOLUTION INTRAVENOUS; SUBCUTANEOUS
Status: ON HOLD | COMMUNITY
End: 2018-06-11

## 2018-06-11 RX ORDER — CEPHALEXIN 500 MG/1
500 CAPSULE ORAL 3 TIMES DAILY
Qty: 90 CAPSULE | Refills: 0 | Status: SHIPPED | OUTPATIENT
Start: 2018-06-11 | End: 2018-07-11

## 2018-06-11 RX ORDER — TRAMADOL HYDROCHLORIDE 50 MG/1
50 TABLET ORAL EVERY 6 HOURS PRN
Qty: 20 TABLET | Refills: 0 | Status: SHIPPED | OUTPATIENT
Start: 2018-06-11 | End: 2019-05-13 | Stop reason: CLARIF

## 2018-06-11 NOTE — PROGRESS NOTES
Multidisciplinary Discharge Rounds held 6/11/2018. Treatment team members present today include , , Charge Nurse,  Nurse, RT, PT and Pharmacy caring for [employer]      Other care providers present:    Patient Active Probl

## 2018-06-11 NOTE — PROGRESS NOTES
[facility]  Progress Note     Jennetta Floor Patient Status:  Inpatient    5/15/1935 MRN [id number]   [facility] 5NW-A Attending Nell Welch, [address] [facility] Day # 4 PCP ABIGAIL GRANDA     Chief Complaint: Confusion    S: Patient see Oral BID   • atorvastatin  80 mg Oral Daily   • aspirin  81 mg Oral Daily   • enoxaparin  40 mg Subcutaneous Daily   • haloperidol lactate  5 mg Intravenous Once       ASSESSMENT / PLAN:     1. LLE cellulitis  1. Abx per ID  2.  Follow cultures - negative s

## 2018-06-11 NOTE — DISCHARGE SUMMARY
[facility] HOSPITALIST  DISCHARGE SUMMARY     Demarco Chong Patient Status:  Inpatient    5/15/1935 MRN [id number]   [facility] 5NW-A Attending No att. providers found   Hosp Day # 4 PCP [address]     Date of Admission: 2018  Date taking on:  7/11/2018  Quantity:  90 capsule  Refills:  0     TraMADol HCl 50 MG Tabs  Commonly known as:  ULTRAM      Take 1 tablet (50 mg total) by mouth every 6 (six) hours as needed.    Quantity:  20 tablet  Refills:  0        CHANGE how you take these insulin glargine 100 UNIT/ML Soln  Commonly known as:  LANTUS              Where to Get Your Medications      Please  your prescriptions at the location directed by your doctor or nurse    Bring a paper prescription for each of these medications

## 2018-06-11 NOTE — PROGRESS NOTES
[facility]                INFECTIOUS DISEASE PROGRESS NOTE    Mike Nielsen Patient Status:  Inpatient    5/15/1935 MRN [id number]   [facility] [place] 5NW-A Attending Jose Shields,    Hosp Day # 4 PCP ABIGAIL GRANDA     Antibiotic collapse     Abrasion of face, initial encounter     Abrasion of knee, unspecified laterality, initial encounter     Acute renal failure, unspecified acute renal failure type (HCC)     Leukocytosis, unspecified type     Elevated troponin     DIMA (acute kid

## 2018-06-11 NOTE — PHYSICAL THERAPY NOTE
Patient presented semi-supine in bed; VSS and agreeable to participate. Transferred supine>sit>stand w/supervision assist.  Ambulated 300' w/RW and SBA. Upon completion, patient made comfortable sitting EOB with call light in reach.   WALT Blount made aware

## 2018-06-12 NOTE — HOME CARE LIAISON
Received inbound call from sonBright to notify Residential liasion that patient is scheduled for MD appointment tomorrow at [facility] clinic with Dr. Garret Crawford.

## 2018-06-12 NOTE — PROGRESS NOTES
Pharmacy Progress Note: Discharge Medication Counseling    Jose Eduardo Hurley has been counseled on 9 medications. Of these medications, the following are new to the patient:    1. Cephalexin    2.  Tramadol HCl    The indication and common side effects of

## 2018-06-12 NOTE — CM/SW NOTE
Patient discharged on 06/11/2018 as previously planned.        06/12/18 0800   Discharge disposition   Expected discharge disposition Home-Health   Name of Facillity/Home Care/Hospice Residential   Home services after discharge Skilled home care   Discharge

## 2018-06-26 ENCOUNTER — NURSE ONLY (OUTPATIENT)
Dept: ENDOCRINOLOGY CLINIC | Facility: CLINIC | Age: 83
End: 2018-06-26

## 2018-06-26 DIAGNOSIS — E11.65 TYPE 2 DIABETES MELLITUS WITH HYPERGLYCEMIA, WITH LONG-TERM CURRENT USE OF INSULIN (HCC): Primary | ICD-10-CM

## 2018-06-26 DIAGNOSIS — Z79.4 TYPE 2 DIABETES MELLITUS WITH HYPERGLYCEMIA, WITH LONG-TERM CURRENT USE OF INSULIN (HCC): Primary | ICD-10-CM

## 2018-06-26 PROCEDURE — G0108 DIAB MANAGE TRN  PER INDIV: HCPCS

## 2018-07-12 ENCOUNTER — NURSE ONLY (OUTPATIENT)
Dept: ENDOCRINOLOGY CLINIC | Facility: CLINIC | Age: 83
End: 2018-07-12

## 2018-07-12 VITALS — WEIGHT: 174 LBS | BODY MASS INDEX: 32 KG/M2

## 2018-07-12 DIAGNOSIS — E11.65 TYPE 2 DIABETES MELLITUS WITH HYPERGLYCEMIA, WITH LONG-TERM CURRENT USE OF INSULIN (HCC): Primary | ICD-10-CM

## 2018-07-12 DIAGNOSIS — Z79.4 TYPE 2 DIABETES MELLITUS WITH HYPERGLYCEMIA, WITH LONG-TERM CURRENT USE OF INSULIN (HCC): Primary | ICD-10-CM

## 2018-07-12 PROCEDURE — G0108 DIAB MANAGE TRN  PER INDIV: HCPCS

## 2018-07-12 RX ORDER — GLIPIZIDE 10 MG/1
10 TABLET ORAL
Qty: 60 TABLET | Refills: 6 | Status: SHIPPED | OUTPATIENT
Start: 2018-07-12 | End: 2019-05-13 | Stop reason: CLARIF

## 2018-07-12 NOTE — PROGRESS NOTES
Met with Natasha Babinski and his daughter for a 2 week follow up post hospitalization. She has been testing BG levels 3 times a day before meals. BG levels are mostly within acceptable ranges he does run in the 70's if he skips or delay meals.   He is taking Gli

## 2018-08-09 ENCOUNTER — LAB ENCOUNTER (OUTPATIENT)
Dept: LAB | Age: 83
End: 2018-08-09
Attending: NURSE PRACTITIONER
Payer: COMMERCIAL

## 2018-08-09 DIAGNOSIS — E78.5 HYPERLIPEMIA: ICD-10-CM

## 2018-08-09 DIAGNOSIS — I10 ESSENTIAL HYPERTENSION, MALIGNANT: ICD-10-CM

## 2018-08-09 DIAGNOSIS — E11.9 DIABETES MELLITUS (HCC): Primary | ICD-10-CM

## 2018-08-09 LAB
ALBUMIN SERPL-MCNC: 3.5 G/DL (ref 3.5–4.8)
ALBUMIN/GLOB SERPL: 0.8 {RATIO} (ref 1–2)
ALP LIVER SERPL-CCNC: 167 U/L (ref 45–117)
ALT SERPL-CCNC: 29 U/L (ref 17–63)
ANION GAP SERPL CALC-SCNC: 11 MMOL/L (ref 0–18)
AST SERPL-CCNC: 29 U/L (ref 15–41)
BILIRUB SERPL-MCNC: 0.7 MG/DL (ref 0.1–2)
BUN BLD-MCNC: 27 MG/DL (ref 8–20)
BUN/CREAT SERPL: 16.8 (ref 10–20)
CALCIUM BLD-MCNC: 9.3 MG/DL (ref 8.3–10.3)
CHLORIDE SERPL-SCNC: 106 MMOL/L (ref 101–111)
CHOLEST SMN-MCNC: 159 MG/DL (ref ?–200)
CO2 SERPL-SCNC: 24 MMOL/L (ref 22–32)
CREAT BLD-MCNC: 1.61 MG/DL (ref 0.7–1.3)
EST. AVERAGE GLUCOSE BLD GHB EST-MCNC: 177 MG/DL (ref 68–126)
GLOBULIN PLAS-MCNC: 4.4 G/DL (ref 2.5–3.7)
GLUCOSE BLD-MCNC: 69 MG/DL (ref 70–99)
HBA1C MFR BLD HPLC: 7.8 % (ref ?–5.7)
HDLC SERPL-MCNC: 48 MG/DL (ref 40–59)
LDLC SERPL CALC-MCNC: 88 MG/DL (ref ?–100)
M PROTEIN MFR SERPL ELPH: 7.9 G/DL (ref 6.1–8.3)
NONHDLC SERPL-MCNC: 111 MG/DL (ref ?–130)
OSMOLALITY SERPL CALC.SUM OF ELEC: 295 MOSM/KG (ref 275–295)
POTASSIUM SERPL-SCNC: 4.1 MMOL/L (ref 3.6–5.1)
SODIUM SERPL-SCNC: 141 MMOL/L (ref 136–144)
TRIGL SERPL-MCNC: 115 MG/DL (ref 30–149)
VLDLC SERPL CALC-MCNC: 23 MG/DL (ref 0–30)

## 2018-08-09 PROCEDURE — 36415 COLL VENOUS BLD VENIPUNCTURE: CPT

## 2018-08-09 PROCEDURE — 83036 HEMOGLOBIN GLYCOSYLATED A1C: CPT

## 2018-08-09 PROCEDURE — 80053 COMPREHEN METABOLIC PANEL: CPT

## 2018-08-09 PROCEDURE — 80061 LIPID PANEL: CPT

## 2018-09-12 ENCOUNTER — NURSE ONLY (OUTPATIENT)
Dept: ENDOCRINOLOGY CLINIC | Facility: CLINIC | Age: 83
End: 2018-09-12

## 2018-09-12 VITALS — DIASTOLIC BLOOD PRESSURE: 64 MMHG | SYSTOLIC BLOOD PRESSURE: 110 MMHG | WEIGHT: 176 LBS | BODY MASS INDEX: 32 KG/M2

## 2018-09-12 DIAGNOSIS — Z79.4 TYPE 2 DIABETES MELLITUS WITH HYPERGLYCEMIA, WITH LONG-TERM CURRENT USE OF INSULIN (HCC): Primary | ICD-10-CM

## 2018-09-12 DIAGNOSIS — E11.65 TYPE 2 DIABETES MELLITUS WITH HYPERGLYCEMIA, WITH LONG-TERM CURRENT USE OF INSULIN (HCC): Primary | ICD-10-CM

## 2018-09-12 PROCEDURE — G0108 DIAB MANAGE TRN  PER INDIV: HCPCS

## 2018-09-12 RX ORDER — FLASH GLUCOSE SENSOR
1 KIT MISCELLANEOUS ONCE
Qty: 1 EACH | Refills: 0 | Status: SHIPPED | OUTPATIENT
Start: 2018-09-12 | End: 2018-09-12

## 2018-09-12 RX ORDER — FLASH GLUCOSE SENSOR
1 KIT MISCELLANEOUS
Qty: 3 DEVICE | Refills: 6 | Status: SHIPPED | OUTPATIENT
Start: 2018-09-12 | End: 2019-05-13 | Stop reason: CLARIF

## 2018-09-12 NOTE — PROGRESS NOTES
Pierre Ramirez  : 5/15/1935 was seen for Diabetic Education Follow up:    Date: 2018  Start time: 1pm End time: 1:30 pm    Assessment:     Assessment: /64   Wt 176 lb   BMI 32.19 kg/m²      HEMOGLOBIN A1C (%)   Date Value   2018 14.0 Chloride 106 101 - 111 mmol/L    CO2 24.0 22.0 - 32.0 mmol/L    Anion Gap 11 0 - 18 mmol/L    BUN 27 (H) 8 - 20 mg/dL    Creatinine 1.61 (H) 0.70 - 1.30 mg/dL    BUN/CREA Ratio 16.8 10.0 - 20.0    Calcium, Total 9.3 8.3 - 10.3 mg/dL    Calculated [place] care discussed. [place] depression and anxiety form given and reviewed. Family involvement/support encouraged  Depression and diabetes reviewed. Recommendations:      1. Follow recommended meal plan   2.  Test BG fasting and 2 hour

## 2018-09-24 RX ORDER — FLASH GLUCOSE SENSOR
1 KIT MISCELLANEOUS ONCE
Qty: 1 DEVICE | Refills: 0 | Status: SHIPPED | OUTPATIENT
Start: 2018-09-24 | End: 2018-09-24

## 2018-09-24 RX ORDER — FLASH GLUCOSE SENSOR
1 KIT MISCELLANEOUS
Qty: 3 EACH | Refills: 6 | Status: SHIPPED | OUTPATIENT
Start: 2018-09-24 | End: 2019-04-29

## 2018-10-03 ENCOUNTER — NURSE ONLY (OUTPATIENT)
Dept: ENDOCRINOLOGY CLINIC | Facility: CLINIC | Age: 83
End: 2018-10-03
Payer: COMMERCIAL

## 2018-10-03 VITALS — WEIGHT: 169 LBS | BODY MASS INDEX: 31 KG/M2

## 2018-10-03 DIAGNOSIS — E11.65 TYPE 2 DIABETES MELLITUS WITH HYPERGLYCEMIA, WITH LONG-TERM CURRENT USE OF INSULIN (HCC): Primary | ICD-10-CM

## 2018-10-03 DIAGNOSIS — Z79.4 TYPE 2 DIABETES MELLITUS WITH HYPERGLYCEMIA, WITH LONG-TERM CURRENT USE OF INSULIN (HCC): Primary | ICD-10-CM

## 2018-10-03 PROCEDURE — G0108 DIAB MANAGE TRN  PER INDIV: HCPCS

## 2018-10-04 NOTE — PROGRESS NOTES
Lucy Clay   5/15/1935 was seen for Diagnostic Continuous Glucose Sensor Initial Evaluation:    Date: 10/4/2018  Start time: 3:30pm End time:  [facility]  Sensor Lot:   Expiration: 2020  Sensor placed: left arm    Reviewed the c

## 2018-11-07 ENCOUNTER — NURSE ONLY (OUTPATIENT)
Dept: ENDOCRINOLOGY CLINIC | Facility: CLINIC | Age: 83
End: 2018-11-07
Payer: COMMERCIAL

## 2018-11-07 VITALS
SYSTOLIC BLOOD PRESSURE: 154 MMHG | BODY MASS INDEX: 28.37 KG/M2 | HEART RATE: 88 BPM | DIASTOLIC BLOOD PRESSURE: 88 MMHG | HEIGHT: 66 IN | WEIGHT: 176.5 LBS

## 2018-11-07 DIAGNOSIS — E11.65 TYPE 2 DIABETES MELLITUS WITH HYPERGLYCEMIA, WITH LONG-TERM CURRENT USE OF INSULIN (HCC): Primary | ICD-10-CM

## 2018-11-07 DIAGNOSIS — Z79.4 TYPE 2 DIABETES MELLITUS WITH HYPERGLYCEMIA, WITH LONG-TERM CURRENT USE OF INSULIN (HCC): Primary | ICD-10-CM

## 2018-11-07 PROCEDURE — G0108 DIAB MANAGE TRN  PER INDIV: HCPCS

## 2018-11-07 NOTE — PROGRESS NOTES
Venu Wan  SJN1/61/[id number] was seen for Continuous Glucose Sensor Follow-up Visit:    Date: 11/7/2018 Start time: 1pm End time: 2pm      Sensor Type: Ron Arroyo Personal    Site Assessment: right arm and it is free of redness and swelling    Reviewed CGMS d

## 2018-12-06 ENCOUNTER — LAB ENCOUNTER (OUTPATIENT)
Dept: LAB | Age: 83
End: 2018-12-06
Attending: NURSE PRACTITIONER
Payer: COMMERCIAL

## 2018-12-06 DIAGNOSIS — N18.30 CHRONIC KIDNEY DISEASE, STAGE III (MODERATE) (HCC): Primary | ICD-10-CM

## 2018-12-06 DIAGNOSIS — R32 UNSPECIFIED URINARY INCONTINENCE: ICD-10-CM

## 2018-12-06 PROCEDURE — 80053 COMPREHEN METABOLIC PANEL: CPT

## 2018-12-06 PROCEDURE — 87088 URINE BACTERIA CULTURE: CPT

## 2018-12-06 PROCEDURE — 87086 URINE CULTURE/COLONY COUNT: CPT

## 2018-12-06 PROCEDURE — 87186 SC STD MICRODIL/AGAR DIL: CPT

## 2018-12-06 PROCEDURE — 81001 URINALYSIS AUTO W/SCOPE: CPT

## 2018-12-06 PROCEDURE — 36415 COLL VENOUS BLD VENIPUNCTURE: CPT

## 2018-12-12 ENCOUNTER — NURSE ONLY (OUTPATIENT)
Dept: ENDOCRINOLOGY CLINIC | Facility: CLINIC | Age: 83
End: 2018-12-12
Payer: COMMERCIAL

## 2018-12-12 VITALS — WEIGHT: 176.19 LBS | HEIGHT: 66 IN | BODY MASS INDEX: 28.32 KG/M2

## 2018-12-12 DIAGNOSIS — Z79.4 TYPE 2 DIABETES MELLITUS WITH HYPERGLYCEMIA, WITH LONG-TERM CURRENT USE OF INSULIN (HCC): Primary | ICD-10-CM

## 2018-12-12 DIAGNOSIS — E11.65 TYPE 2 DIABETES MELLITUS WITH HYPERGLYCEMIA, WITH LONG-TERM CURRENT USE OF INSULIN (HCC): Primary | ICD-10-CM

## 2018-12-12 PROCEDURE — G0108 DIAB MANAGE TRN  PER INDIV: HCPCS

## 2018-12-12 NOTE — PROGRESS NOTES
Agustin Barron  BDY6/78/5752 was seen for Continuous Glucose Sensor Follow-up Visit:    Date: 12/12/2018  Start time: 1pm End time: 1:30pm      Sensor Type: jun 10 day sensor    Site Assessment: right arm and it is free of redness and swellin

## 2018-12-19 ENCOUNTER — LAB ENCOUNTER (OUTPATIENT)
Dept: LAB | Age: 83
End: 2018-12-19
Attending: NURSE PRACTITIONER
Payer: COMMERCIAL

## 2018-12-19 DIAGNOSIS — Z12.11 SPECIAL SCREENING FOR MALIGNANT NEOPLASMS, COLON: Primary | ICD-10-CM

## 2018-12-19 PROCEDURE — 82272 OCCULT BLD FECES 1-3 TESTS: CPT

## 2018-12-19 PROCEDURE — 82270 OCCULT BLOOD FECES: CPT

## 2018-12-19 PROCEDURE — 82274 ASSAY TEST FOR BLOOD FECAL: CPT

## 2019-01-16 ENCOUNTER — NURSE ONLY (OUTPATIENT)
Dept: ENDOCRINOLOGY CLINIC | Facility: CLINIC | Age: 84
End: 2019-01-16
Payer: COMMERCIAL

## 2019-01-16 VITALS — WEIGHT: 176 LBS | BODY MASS INDEX: 28.28 KG/M2 | HEIGHT: 66 IN

## 2019-01-16 DIAGNOSIS — Z79.4 TYPE 2 DIABETES MELLITUS WITH HYPERGLYCEMIA, WITH LONG-TERM CURRENT USE OF INSULIN (HCC): Primary | ICD-10-CM

## 2019-01-16 DIAGNOSIS — E11.65 TYPE 2 DIABETES MELLITUS WITH HYPERGLYCEMIA, WITH LONG-TERM CURRENT USE OF INSULIN (HCC): Primary | ICD-10-CM

## 2019-01-16 PROCEDURE — G0108 DIAB MANAGE TRN  PER INDIV: HCPCS

## 2019-01-16 NOTE — PROGRESS NOTES
Soledad Flores  WSZ3/66/8775 was seen for Continuous Glucose Sensor Follow-up Visit:    Date: 1/16/2019  Start time: 1:30 End time: 2:30p      Sensor Type: jun personal 10 days    Site Assessment: left arm and it is free of redness and swelli

## 2019-02-20 ENCOUNTER — NURSE ONLY (OUTPATIENT)
Dept: ENDOCRINOLOGY CLINIC | Facility: CLINIC | Age: 84
End: 2019-02-20
Payer: COMMERCIAL

## 2019-02-20 VITALS — BODY MASS INDEX: 26 KG/M2 | WEIGHT: 160 LBS

## 2019-02-20 DIAGNOSIS — E11.65 TYPE 2 DIABETES MELLITUS WITH HYPERGLYCEMIA, WITH LONG-TERM CURRENT USE OF INSULIN (HCC): Primary | ICD-10-CM

## 2019-02-20 DIAGNOSIS — Z79.4 TYPE 2 DIABETES MELLITUS WITH HYPERGLYCEMIA, WITH LONG-TERM CURRENT USE OF INSULIN (HCC): Primary | ICD-10-CM

## 2019-02-20 PROCEDURE — G0108 DIAB MANAGE TRN  PER INDIV: HCPCS

## 2019-02-20 NOTE — PROGRESS NOTES
Naya Kumar  VFA4/84/4101 was seen for Continuous Glucose Sensor Follow-up Visit:    Date: 2/20/2019  Referring Provider: Mily Peres  Start time: 2pm End time: 3pm    Sensor Type: Selene 14 day personal    Site Assessment:left arm and it is fr

## 2019-04-17 ENCOUNTER — NURSE ONLY (OUTPATIENT)
Dept: ENDOCRINOLOGY CLINIC | Facility: CLINIC | Age: 84
End: 2019-04-17
Payer: COMMERCIAL

## 2019-04-17 VITALS — HEIGHT: 66 IN | WEIGHT: 173 LBS | BODY MASS INDEX: 27.8 KG/M2

## 2019-04-17 DIAGNOSIS — E11.65 TYPE 2 DIABETES MELLITUS WITH HYPERGLYCEMIA, WITH LONG-TERM CURRENT USE OF INSULIN (HCC): Primary | ICD-10-CM

## 2019-04-17 DIAGNOSIS — Z79.4 TYPE 2 DIABETES MELLITUS WITH HYPERGLYCEMIA, WITH LONG-TERM CURRENT USE OF INSULIN (HCC): Primary | ICD-10-CM

## 2019-04-17 PROCEDURE — G0108 DIAB MANAGE TRN  PER INDIV: HCPCS

## 2019-04-17 NOTE — PROGRESS NOTES
Maximiliano Hale  DXQ0/21/4722 was seen for Continuous Glucose Sensor Follow-up Visit:    Date: 4/17/2019   Start time: 1pm End time: 2pm    Sensor Type: Selene 14 day    Site Assessment: left arm and it is free of redness and swelling    Reviewed

## 2019-04-30 RX ORDER — FLASH GLUCOSE SENSOR
KIT MISCELLANEOUS
Qty: 3 EACH | Refills: 2 | Status: SHIPPED | OUTPATIENT
Start: 2019-04-30 | End: 2019-05-13 | Stop reason: CLARIF

## 2019-05-13 ENCOUNTER — APPOINTMENT (OUTPATIENT)
Dept: GENERAL RADIOLOGY | Facility: HOSPITAL | Age: 84
DRG: 177 | End: 2019-05-13
Attending: EMERGENCY MEDICINE
Payer: COMMERCIAL

## 2019-05-13 ENCOUNTER — HOSPITAL ENCOUNTER (INPATIENT)
Facility: HOSPITAL | Age: 84
LOS: 8 days | Discharge: HOME HEALTH | DRG: 177 | End: 2019-05-21
Attending: EMERGENCY MEDICINE | Admitting: HOSPITALIST
Payer: COMMERCIAL

## 2019-05-13 ENCOUNTER — APPOINTMENT (OUTPATIENT)
Dept: CT IMAGING | Facility: HOSPITAL | Age: 84
DRG: 177 | End: 2019-05-13
Attending: HOSPITALIST
Payer: COMMERCIAL

## 2019-05-13 DIAGNOSIS — R73.9 HYPERGLYCEMIA: ICD-10-CM

## 2019-05-13 DIAGNOSIS — R07.9 ACUTE CHEST PAIN: ICD-10-CM

## 2019-05-13 DIAGNOSIS — J18.9 COMMUNITY ACQUIRED PNEUMONIA, UNSPECIFIED LATERALITY: Primary | ICD-10-CM

## 2019-05-13 PROCEDURE — 99223 1ST HOSP IP/OBS HIGH 75: CPT | Performed by: HOSPITALIST

## 2019-05-13 PROCEDURE — 71045 X-RAY EXAM CHEST 1 VIEW: CPT | Performed by: EMERGENCY MEDICINE

## 2019-05-13 PROCEDURE — 74176 CT ABD & PELVIS W/O CONTRAST: CPT | Performed by: HOSPITALIST

## 2019-05-13 RX ORDER — NITROGLYCERIN 80 MG/1
1 PATCH TRANSDERMAL DAILY
Status: ON HOLD | COMMUNITY
End: 2019-05-21

## 2019-05-13 RX ORDER — DEXTROSE MONOHYDRATE 25 G/50ML
50 INJECTION, SOLUTION INTRAVENOUS
Status: DISCONTINUED | OUTPATIENT
Start: 2019-05-13 | End: 2019-05-21

## 2019-05-13 RX ORDER — ASPIRIN 81 MG/1
81 TABLET ORAL DAILY
COMMUNITY

## 2019-05-13 RX ORDER — FUROSEMIDE 10 MG/ML
20 INJECTION INTRAMUSCULAR; INTRAVENOUS ONCE
Status: COMPLETED | OUTPATIENT
Start: 2019-05-13 | End: 2019-05-13

## 2019-05-13 RX ORDER — ENALAPRIL MALEATE 20 MG/1
20 TABLET ORAL 2 TIMES DAILY
Status: ON HOLD | COMMUNITY
End: 2019-05-21

## 2019-05-13 RX ORDER — ACETAMINOPHEN 500 MG
1000 TABLET ORAL ONCE
Status: COMPLETED | OUTPATIENT
Start: 2019-05-13 | End: 2019-05-13

## 2019-05-13 RX ORDER — GLIPIZIDE 5 MG/1
5 TABLET ORAL
Status: ON HOLD | COMMUNITY
End: 2019-05-21

## 2019-05-13 RX ORDER — IPRATROPIUM BROMIDE AND ALBUTEROL SULFATE 2.5; .5 MG/3ML; MG/3ML
3 SOLUTION RESPIRATORY (INHALATION) ONCE
Status: COMPLETED | OUTPATIENT
Start: 2019-05-13 | End: 2019-05-13

## 2019-05-13 RX ORDER — IPRATROPIUM BROMIDE AND ALBUTEROL SULFATE 2.5; .5 MG/3ML; MG/3ML
3 SOLUTION RESPIRATORY (INHALATION)
Status: DISCONTINUED | OUTPATIENT
Start: 2019-05-13 | End: 2019-05-13

## 2019-05-13 RX ORDER — ACETAMINOPHEN 500 MG
TABLET ORAL
Status: DISPENSED
Start: 2019-05-13 | End: 2019-05-14

## 2019-05-13 RX ORDER — ACETAMINOPHEN 325 MG/1
650 TABLET ORAL EVERY 6 HOURS PRN
Status: DISCONTINUED | OUTPATIENT
Start: 2019-05-13 | End: 2019-05-21

## 2019-05-13 RX ORDER — ONDANSETRON 2 MG/ML
4 INJECTION INTRAMUSCULAR; INTRAVENOUS EVERY 6 HOURS PRN
Status: DISCONTINUED | OUTPATIENT
Start: 2019-05-13 | End: 2019-05-21

## 2019-05-13 RX ORDER — ENOXAPARIN SODIUM 100 MG/ML
30 INJECTION SUBCUTANEOUS EVERY EVENING
Status: DISCONTINUED | OUTPATIENT
Start: 2019-05-13 | End: 2019-05-15 | Stop reason: DRUGHIGH

## 2019-05-13 RX ORDER — INSULIN ASPART 100 [IU]/ML
0.2 INJECTION, SOLUTION INTRAVENOUS; SUBCUTANEOUS ONCE
Status: COMPLETED | OUTPATIENT
Start: 2019-05-13 | End: 2019-05-13

## 2019-05-13 RX ORDER — SODIUM CHLORIDE 9 MG/ML
INJECTION, SOLUTION INTRAVENOUS CONTINUOUS
Status: ACTIVE | OUTPATIENT
Start: 2019-05-13 | End: 2019-05-13

## 2019-05-13 RX ORDER — METOCLOPRAMIDE HYDROCHLORIDE 5 MG/ML
5 INJECTION INTRAMUSCULAR; INTRAVENOUS EVERY 8 HOURS PRN
Status: DISCONTINUED | OUTPATIENT
Start: 2019-05-13 | End: 2019-05-21

## 2019-05-13 RX ORDER — IPRATROPIUM BROMIDE AND ALBUTEROL SULFATE 2.5; .5 MG/3ML; MG/3ML
3 SOLUTION RESPIRATORY (INHALATION)
Status: DISCONTINUED | OUTPATIENT
Start: 2019-05-13 | End: 2019-05-18

## 2019-05-13 RX ORDER — CLINDAMYCIN PHOSPHATE 600 MG/50ML
600 INJECTION INTRAVENOUS EVERY 8 HOURS
Status: DISCONTINUED | OUTPATIENT
Start: 2019-05-13 | End: 2019-05-17

## 2019-05-13 RX ORDER — ASPIRIN 81 MG/1
243 TABLET, CHEWABLE ORAL ONCE
Status: COMPLETED | OUTPATIENT
Start: 2019-05-13 | End: 2019-05-13

## 2019-05-13 RX ORDER — ALBUTEROL SULFATE 2.5 MG/3ML
10 SOLUTION RESPIRATORY (INHALATION) CONTINUOUS
Status: DISCONTINUED | OUTPATIENT
Start: 2019-05-13 | End: 2019-05-13

## 2019-05-13 NOTE — PLAN OF CARE
NURSING ADMISSION NOTE      Patient admitted via ER  Oriented to room. Safety precautions initiated. Bed in low position. Call light in reach, return demo done.    Updated history and pta meds, gave information about sleep study, to r/o bentley, aspirati

## 2019-05-13 NOTE — PROGRESS NOTES
[facility] Pharmacy Note:  Renal Dose Adjustment for Metoclopramide (REGLAN)      Maximiliano Hale has been prescribed metoclopramide 10 mg q8hr prn.     Est CrCl: ~30 mL/min    His calculated creatinine clearance is < 40 mL/min, therefore the dose of me

## 2019-05-13 NOTE — CONSULTS
Pulmonary H&P/Consult       NAME: [address]: C4/C4 - MRN: [id number] - Age: 81year old - :  5/15/1935    Date of Admission: 2019 11:41 AM  Admission Diagnosis: No admission diagnoses are documented for this encounter.   Reaso file      Transportation needs:        Medical: Not on file        Non-medical: Not on file    Tobacco Use      Smoking status: Former Smoker        Years: 20.00        Types: Cigarettes        Quit date: 11/10/1986        Years since quittin.5      S Bromide Stopped (05/13/19 5725)     PRN Medication:     REVIEW OF SYSTEMS:   GENERAL:  feels well otherwise   SKIN:  denies any unusual skin lesions   EYES:  denies blurred vision or double vision   HEENT:  denies nasal congestion, sinus pain/tenderness  R Regular rate and rhythm, S1 and S2 normal, no murmur, rub   or gallop   Abdomen:     Soft, non-tender, bowel sounds active all four quadrants,     no masses, no organomegaly   Extremities:   Trace edema in LE bobbi R > L   Pulses:   2+ and symmetric all extr Imaging: CT abd.pel reviewed and compared to CT abd/pel from 2014- pt has a LLL infiltrate on both scans it has progressed significantly on the subsequent scan    ASSESSMENT/PLAN:    1.  Abnl CT  -either recurrent PNA in the same location or more like

## 2019-05-13 NOTE — H&P
[facility]  History and Physical     Dario Ochoa Patient Status:  Emergency    5/15/1935 MRN [id number]   Location [facility] Attending Justus Rahman MD   Hosp Day # 0 PCP Sylvie Hernandez     Chief Co Allergies    Medications:      No current facility-administered medications on file prior to encounter. Current Outpatient Medications on File Prior to Encounter:  Enalapril Maleate 20 MG Oral Tab Take 20 mg by mouth 2 (two) times daily.  Disp:  Rfl:    maggie viral bronchitis, possible pneumonia  2. Dyspnea on exertion, edema with orthopnea speaks more to heart failure, suspect component of acute diastolic heart failure  1. Lasix 20 IV x 1 now  2. ECHO  3. Daily weight  4. Cardiology consult  3.  Rhino/entero vi

## 2019-05-13 NOTE — CONSULTS
[facility]  Cardiology Consultation    Cynthea Round Patient Status:  Emergency    5/15/1935 MRN [id number]   Location [facility] Attending Ev Arthur MD   Hosp Day # 0 PCP Vicente Rivas     Reason for PERCUTANEOUS  TRANSLUMINAL CORONARY ANGIOPLASTY  1998     No family history on file. reports that he quit smoking about 32 years ago. His smoking use included cigarettes. He quit after 20.00 years of use.  He has never used smokeless tobacco. He reports t ALB 2.8 05/13/2019    ALKPHO 161 05/13/2019    BILT 1.6 05/13/2019    TP 7.2 05/13/2019    AST 17 05/13/2019    ALT 24 05/13/2019    TSH 0.714 05/13/2019    LIP 66 05/13/2019    TROP <0.045 05/13/2019    PGLU 334 05/13/2019       Imaging:  CXR with PNA

## 2019-05-13 NOTE — ED NOTES
assisted patient to void per urinal while standing at bedside. Pt assisted back to cart and in a position of comfort. Patient to floor with transport. belongings with patient. Skin p/w/d, no acute distress.

## 2019-05-13 NOTE — ED INITIAL ASSESSMENT (HPI)
Patient to ED for intermittent substernal chest pressure and hyperglycemia x 3 days. Patient states vomited yesterday and has had intermittent DIVINA.

## 2019-05-13 NOTE — ED PROVIDER NOTES
Patient Seen in: [facility] 2ne-a    History   Patient presents with:  Hyperglycemia (metabolic)  Chest Pain Angina (cardiovascular)    Stated Complaint: hyperglycemia    HPI    16-year-old male speaks only Welsh complaining of shortness of breath Exam    Patient is alert appears mildly dyspneic somewhat tachycardic with a heart rate around 120.   HEENT exam within normal limits neck there is no lymphadenopathy or JVD lungs there is moderate crackles scattered throughout cardiovascular exam shows reg Notable for the following components:    Rhinovirus/Entero PCR: Positive (*)     All other components within normal limits   CBC W/ DIFFERENTIAL - Abnormal; Notable for the following components:    HGB 12.9 (*)     HCT 37.0 (*)     Neutrophil Absolute Prel diagnosis)  Hyperglycemia  Acute chest pain    Disposition:  Admit  5/13/2019  2:04 pm    Follow-up:  No follow-up provider specified.       Medications Prescribed:  Current Discharge Medication List        Present on Admission  Date Reviewed: 2/20/2019

## 2019-05-14 ENCOUNTER — APPOINTMENT (OUTPATIENT)
Dept: ULTRASOUND IMAGING | Facility: HOSPITAL | Age: 84
DRG: 177 | End: 2019-05-14
Attending: HOSPITALIST
Payer: COMMERCIAL

## 2019-05-14 ENCOUNTER — APPOINTMENT (OUTPATIENT)
Dept: CV DIAGNOSTICS | Facility: HOSPITAL | Age: 84
DRG: 177 | End: 2019-05-14
Attending: HOSPITALIST
Payer: COMMERCIAL

## 2019-05-14 PROCEDURE — 93306 TTE W/DOPPLER COMPLETE: CPT | Performed by: HOSPITALIST

## 2019-05-14 PROCEDURE — 99233 SBSQ HOSP IP/OBS HIGH 50: CPT | Performed by: HOSPITALIST

## 2019-05-14 PROCEDURE — 93970 EXTREMITY STUDY: CPT | Performed by: HOSPITALIST

## 2019-05-14 RX ORDER — ATORVASTATIN CALCIUM 10 MG/1
10 TABLET, FILM COATED ORAL NIGHTLY
Status: DISCONTINUED | OUTPATIENT
Start: 2019-05-14 | End: 2019-05-21

## 2019-05-14 RX ORDER — POTASSIUM CHLORIDE 20 MEQ/1
40 TABLET, EXTENDED RELEASE ORAL EVERY 4 HOURS
Status: COMPLETED | OUTPATIENT
Start: 2019-05-14 | End: 2019-05-14

## 2019-05-14 NOTE — PLAN OF CARE
Sinus rhythm w/ frequent pacs  and burst a fib deny palpitations deny chest pain  Patient speaks Cameroonian only - line use  Seen/examined by dr. Jeyson Klein MD reviewed  ekg strips  02 sat room air at rest=90% 2l/nc =93%    02 sat  at 2l/nc  w/ administer replacement therapy as ordered  Outcome: Not Progressing     Problem: Diabetes/Glucose Control  Goal: Glucose maintained within prescribed range  Description  INTERVENTIONS:  - Monitor Blood Glucose as ordered  - Assess for signs and symptoms of

## 2019-05-14 NOTE — PROGRESS NOTES
05/14/19 1335   Clinical Encounter Type   Referral To Nurse  (Oralia Byrnes made a referral to the [employer] for [employer] as requested. )   Voodoo Encounters   Spiritual Requests During Visit / Hospitalization [facility]ism [facility]

## 2019-05-14 NOTE — SLP NOTE
ADULT SWALLOWING EVALUATION    ASSESSMENT    ASSESSMENT/OVERALL IMPRESSION:  81year old Iraqi speaking, diabetic male with known HTN and HLP who presents with SOB, productive cough and intermittent epigastric pain. Denies dylan chest pain. + vomiting. position; Slow rate;Small bites and sips; No straw  Medication Administration Recommendations: One pill at a time  Treatment Plan/Recommendations: Dysphagia therapy;Communication evaluation;Aspiration precautions  Discharge Recommendations/Plan: Undetermined Within Functional Limits    Voice Quality: Clear  Respiratory Status: Unlabored  Consistencies Trialed: Thin liquids;Puree;Hard solid; Soft solid  Method of Presentation: Self presentation;Cup;Straw;Single sips  Patient Positioning: Upright    Oral Phase of

## 2019-05-14 NOTE — PROGRESS NOTES
[facility] Patient Status:  Inpatient    5/15/1935 MRN [id number]   [facility] 2NE-A Attending Eliot Maldonado MD   Hosp Day # 1 PCP ABIGAIL [facility] doing a little better.  Some cough Subcutaneous, TID CC  •  Insulin Aspart Pen (NOVOLOG) 100 UNIT/ML flexpen 1-10 Units, 1-10 Units, Subcutaneous, TID AC and HS  •  clindamycin in D5W (CLEOCIN) premix 600mg/50ml, 600 mg, Intravenous, Q8H  •  ipratropium-albuterol (DUONEB) nebulizer solution

## 2019-05-14 NOTE — OCCUPATIONAL THERAPY NOTE
OCCUPATIONAL THERAPY EVALUATION - INPATIENT     Room Number: 7952/6418-R  Evaluation Date: 5/14/2019  Type of Evaluation: Initial  Presenting Problem: chest pain, rhino/entero virus    Physician Order: IP Consult to Occupational Therapy  Reason for Therapy reflux    • High blood pressure    • High cholesterol    • Hypertriglyceridemia    • Osteoarthritis    • Peripheral vascular disease ([facility] [address]    • Pneumonia due to organism    • Shortness of breath    • Type II or unspecified type diabetes mellitus without me COORDINATION  Gross Motor    WFL    Fine Motor    WFL      ADDITIONAL TESTS                                    NEUROLOGICAL FINDINGS                   ACTIVITY TOLERANCE                         O2 SATURATIONS  SPO2 on Room Air at Rest: 91     SPO2 Ambu hyperglycemia. Diagnosed with pneumonia and rhino/entero virus. Complete medical history and occupational profile noted above. Functional outcome measures completed include AM-PAC, ROM, and MMT.  In this OT evaluation patient presents with the following pe perform grooming: with modified independent  Patient will perform lower body dressing:  with modified independent  Patient will perform toileting: with modified independent    Functional Transfer Goals  Patient will transfer to toilet:  with modified indep

## 2019-05-14 NOTE — PROGRESS NOTES
[facility]  Progress Note     White Expose Patient Status:  Inpatient    5/15/1935 MRN [id number]   [facility] 2NE-A Attending Gab Pelayo, [address] [facility] Se Day # 1 PCP ABIGAIL GRANDA     Chief Complaint: Dyspnea    S: Pa Nightly   • pantoprazole (PROTONIX) IV push  40 mg Intravenous Q24H   • cefTRIAXone  1 g Intravenous Q24H   • enoxaparin  30 mg Subcutaneous QPM   • azithromycin  500 mg Intravenous Q24H   • Insulin Aspart Pen  1-68 Units Subcutaneous TID CC   • Insulin As

## 2019-05-14 NOTE — PLAN OF CARE
Problem: Patient/Family Goals  Goal: Patient/Family Long Term Goal  Description  Patient's Long Term Goal: normal  aic    Interventions:  - take medications as prescribed by doctor  -check blood sugars  As directed by doctor  -follow prescribed diet  - S

## 2019-05-14 NOTE — PLAN OF CARE
Pt is AOx4, Yoruba speaking. Denies chest pain or dyspnea. He is still having a sensation of abdominal bloating. Reglan given. ROSEANNA accuchaudra. SR on tele with frequent PACs. Dr. Caio Saavedra was here this evening and an EKG was done.   He confirmed SR with don

## 2019-05-14 NOTE — PROGRESS NOTES
[facility] Cardiology Progress Note    Patient seen and examined.  Chart reviewed.  Discussed with RN    No chest pain or shortness of breath this AM    /62 (BP Location: Left arm)   Pulse 95   Temp 98 °F (36.7 °C) (Oral)   Resp 22   Ht 5' 5\" (1.651 m)   W chloride 0.9% 100 mL MBP/ADD-vantage 1 g Intravenous Q24H   Enoxaparin Sodium (LOVENOX) 30 MG/0.3ML injection 30 mg 30 mg Subcutaneous QPM   acetaminophen (TYLENOL) tab 650 mg 650 mg Oral Q6H PRN   ondansetron HCl (ZOFRAN) injection 4 mg 4 mg Intravenous Q

## 2019-05-14 NOTE — PROGRESS NOTES
05/13/19 1707 05/13/19 1709 05/13/19 1711   Vital Signs   Temp 98.1 °F (36.7 °C)  --   --    Temp src Oral  --   --    Pulse 73  --   --    Heart Rate Source Monitor  --   --    Cardiac Rhythm Atrial fibrillation  --   --    Resp 20  --   --    Ophelia Rich

## 2019-05-14 NOTE — PROGRESS NOTES
Patient is to have [facility] nuclear stress test as an outpatient. Inpatient stress cancelled per Dr Dilshad Gomes.

## 2019-05-14 NOTE — PLAN OF CARE
Received bedside report on this Pt., at 7811, as Pt., returned from ultrasound. Pt., is awake, A&O, speaks [address] Pt., is SR on Tele monitor, sats greater than 92% on 2 L Oxygen per NC, denies any pain or distress. Pt., ate, tolerated well.  Pt., up to

## 2019-05-14 NOTE — PLAN OF CARE
Problem: Impaired Swallowing  Goal: Minimize aspiration risk  Description  Interventions:  - Patient should be alert and upright for all feedings (90 degrees preferred)  - Offer food and liquids at a slow rate  - No straws  - Encourage small bites of martir

## 2019-05-15 PROCEDURE — 99232 SBSQ HOSP IP/OBS MODERATE 35: CPT | Performed by: HOSPITALIST

## 2019-05-15 RX ORDER — DEXTROSE MONOHYDRATE 25 G/50ML
50 INJECTION, SOLUTION INTRAVENOUS
Status: DISCONTINUED | OUTPATIENT
Start: 2019-05-15 | End: 2019-05-15

## 2019-05-15 RX ORDER — BENZONATATE 200 MG/1
200 CAPSULE ORAL 3 TIMES DAILY
Status: DISCONTINUED | OUTPATIENT
Start: 2019-05-15 | End: 2019-05-21

## 2019-05-15 RX ORDER — ENOXAPARIN SODIUM 100 MG/ML
40 INJECTION SUBCUTANEOUS NIGHTLY
Status: DISCONTINUED | OUTPATIENT
Start: 2019-05-15 | End: 2019-05-21

## 2019-05-15 NOTE — PROGRESS NOTES
[facility]    Electrophysiology Follow Up Note      Juliano Morelos Patient Status:  Inpatient    5/15/1935 MRN [id number]   [facility] 2NE-A Attending Shayna Chapin MD   Hosp Day # 2 PCP ABIGAIL [facility] TID CC  •  Insulin Aspart Pen (NOVOLOG) 100 UNIT/ML flexpen 1-10 Units, 1-10 Units, Subcutaneous, TID AC and HS  •  clindamycin in D5W (CLEOCIN) premix 600mg/50ml, 600 mg, Intravenous, Q8H  •  ipratropium-albuterol (DUONEB) nebulizer solution 3 mL, 3 mL, N setting he has had PAC's.    1) PAC's  - sinus rhythm with PAC's  - has not had sustained atrial fibrillation  - likely exacerbated by viral URI and beta agonist treatment for COPD    2) Chest pain  - likely related to COPD  - Dr. Brian Smith is planning for

## 2019-05-15 NOTE — PROGRESS NOTES
[facility]  Progress Note     Marquis Regan Patient Status:  Inpatient    5/15/1935 MRN [id number]   [facility] 2NE-A Attending Sonam Walker,  [facility] Se Day # 2 PCP ABIGAIL GRANDA     Chief Complaint: Dyspnea    S: Pa last 168 hours. Recent Labs   Lab 05/13/19  1200 05/14/19  0600   TROP <0.045 <0.045            Imaging: Imaging data reviewed in Epic.     Medications:   • enoxaparin  40 mg Subcutaneous Nightly   • benzonatate  200 mg Oral TID   • atorvastatin  10 mg O

## 2019-05-15 NOTE — PLAN OF CARE
Received patient on bed, alert and oriented - Romanian speaking mainly. Denied chest pain. On O2 2L/NC. Discussed POC. Due meds given. Aspiration precaution maintained. Safety measures reinforced, call light within reach. Bed alarm on.  Isolation precaution Control  Goal: Glucose maintained within prescribed range  Description  INTERVENTIONS:  - Monitor Blood Glucose as ordered  - Assess for signs and symptoms of hyperglycemia and hypoglycemia  - Administer ordered medications to maintain glucose within targe patient/family in tolerated functional activity level and precautions during self-care     Outcome: Progressing

## 2019-05-15 NOTE — PROGRESS NOTES
Assumed care of patient at 200  Luxembourgish speaking only, spoke with/gave update to son  A&Ox4, RA, rhonchi/exp wheezes, on 2L NC  NSR, VSS  Voids, up SBA  Possible stress test?, continue abx and nebs, wean O2 as able  Updated patient on POC, call light with

## 2019-05-15 NOTE — PLAN OF CARE
Problem: Impaired Swallowing  Goal: Minimize aspiration risk  Description  Interventions:  - Patient should be alert and upright for all feedings (90 degrees preferred)  - Offer food and liquids at a slow rate  - Ok for straws in single sips  - Encourage

## 2019-05-15 NOTE — PROGRESS NOTES
Pulmonary Progress Note        NAME: Valentin Links - ROOM: 5294/6083-Q - MRN: [id number] - Age: 81year old - : 5/15/1935        Last 24hrs: No events overnight, noting some shaking in his hands, noting a fair amt of cough    OBJECTIVE:   05 34*   CA 8.7 8.0*  --  8.3*       Recent Labs     05/13/19  1200 05/14/19  0600   ALT 24 18   AST 17 14*   ALB 2.8* 2.3*       Invalid input(s): ARTERIALPH, ARTERIALPO2, ARTERIALPCO2, ARTERIALHCO3    No results for input(s): BNP in the last 72 hours.     In Pulmonary and Critical Care

## 2019-05-15 NOTE — PROGRESS NOTES
Pharmacy Note: Renal dose adjustment   Joe Benito was originally prescribed Lovenox 40mg every 24 hours which was renally dose adjusted at the time of the original order per P&T approved renal dosing protocol.   Renal function has now improv

## 2019-05-15 NOTE — CM/SW NOTE
05/15/19 1430   CM/SW Referral Data   Referral Source    Reason for Referral Discharge planning   Informant Children  (sheri slater)   Pertinent Medical Hx   Primary Care Physician Name [facility]   Patient Info   Patient's Mental Status Alert;[place]

## 2019-05-15 NOTE — HOME CARE LIAISON
Received referral from [address] Left VM for son, Narayan Marcelino regarding home health for his father at discharge. Awaiting call back.

## 2019-05-15 NOTE — PHYSICAL THERAPY NOTE
PHYSICAL THERAPY TREATMENT NOTE - INPATIENT    Room Number: 9635/3937-J     Session: 1   Number of Visits to Meet Established Goals: 3    Presenting Problem: Community aquired PNA    Problem List  Principal Problem:    Community acquired pneumonia, unspec have...  -   Turning over in bed (including adjusting bedclothes, sheets and blankets)?: A Little   -   Sitting down on and standing up from a chair with arms (e.g., wheelchair, bedside commode, etc.): A Little   -   Moving from lying on back to sitting on patient questions and concerns addressed    ASSESSMENT     Pt seen this AM for transfers, gait training and BLE strengthening.   Pt with improved ambulation tolerance this session, when compared to PT Eval.  At this time, Pt. presents with decreased balance

## 2019-05-15 NOTE — SLP NOTE
SPEECH DAILY NOTE - INPATIENT    ASSESSMENT & PLAN   ASSESSMENT  Pt seen for dysphagia tx to assess tolerance with recommended diet, ensure proper utilization of aspiration precautions and provide pt/family education. Patient alert and up in bed.   When as demonstrate adequate and timely mastication and oral clearance of hard solid trials and no s/s of aspiration on 100% of the trials to assess candidacy for diet upgrade in 1-3 days.    Defer due to dental status     FOLLOW UP  Follow Up Needed: Yes  SLP Foll

## 2019-05-16 PROCEDURE — 99232 SBSQ HOSP IP/OBS MODERATE 35: CPT | Performed by: HOSPITALIST

## 2019-05-16 RX ORDER — PANTOPRAZOLE SODIUM 40 MG/1
40 TABLET, DELAYED RELEASE ORAL
Status: DISCONTINUED | OUTPATIENT
Start: 2019-05-16 | End: 2019-05-21

## 2019-05-16 RX ORDER — CODEINE PHOSPHATE AND GUAIFENESIN 10; 100 MG/5ML; MG/5ML
5 SOLUTION ORAL EVERY 6 HOURS
Status: DISCONTINUED | OUTPATIENT
Start: 2019-05-16 | End: 2019-05-20

## 2019-05-16 RX ORDER — AMLODIPINE BESYLATE 5 MG/1
5 TABLET ORAL DAILY
Status: DISCONTINUED | OUTPATIENT
Start: 2019-05-16 | End: 2019-05-21

## 2019-05-16 NOTE — PLAN OF CARE
Assumed care for pt at 19:30 on 5/15    A&Ox4, Bed alarm on. Forgets to call, sometimes uses call light. Reports mild bilateral knee arthritic pain, relieved with tylenol  Pt's hands noticed to tremor when in use.   BP elevated all night, 159/79, 156/75, 1 gait  - Educate and engage patient/family in tolerated activity level and precautions  - Recommend use of  RW for transfers and ambulation   Outcome: Progressing     Problem: CARDIOVASCULAR - ADULT  Goal: Absence of cardiac arrhythmias or at baseline  Desc Progressing  Goal: Patient/Family Short Term Goal  Description  Patient's Short Term Goal: blood sugar  To      Interventions:   - review  blpood  Sugar results  -accuchecks and serum  W/ nurse  -review ada diet w/ nurse and family  - See additional

## 2019-05-16 NOTE — PROGRESS NOTES
[facility] Pharmacy Note: Route Optimization for Pantoprazole (PROTONIX)    Patient is currently on Pantoprazole (PROTONIX) 40 mg IV every 24 hours.    The patient meets the criteria to convert to the oral equivalent as established by the IV to Oral conversion pro

## 2019-05-16 NOTE — PROGRESS NOTES
Pulmonary Progress Note     Assessment / Plan:  1. Dyspnea/hypoxia- due to PNA, rhinovirus with asst bronchospasm and component of atelectasis  - wean o2  - IS, reviewed use with patient  - OOB as able  2.  Pneumonia- bibasilar consolidations R>L  -either r

## 2019-05-16 NOTE — SLP NOTE
SPEECH DAILY NOTE - INPATIENT    ASSESSMENT & PLAN   ASSESSMENT  Pt seen for dysphagia tx to assess tolerance with recommended diet, ensure proper utilization of aspiration precautions and provide pt/family education.   Patient alert and up in bed with son timely mastication and oral clearance of hard solid trials and no s/s of aspiration on 100% of the trials to assess candidacy for diet upgrade in 1-3 days.   Defer due to dental status     FOLLOW UP  Follow Up Needed: No  SLP Follow-up Date: 05/16/19  Numb

## 2019-05-16 NOTE — RESPIRATORY THERAPY NOTE
Goal: To achieve optimal ventilation and oxygenation.     INTERVENTIONS:  • Assess for changes in respiratory status  • Assess for changes in mentation and behavior  • Position to facilitate oxygenation and minimize respiratory effort  • Oxygen supplementat

## 2019-05-16 NOTE — CM/SW NOTE
05/16/19 0912   Discharge disposition   Expected discharge disposition Home-Health   Name of Facillity/Home Care/Hospice Residential     [facility] will accept patient probono for home health rn and pt.  Follows with the [facility], will also have a TCC angela

## 2019-05-16 NOTE — PROGRESS NOTES
[facility]  Progress Note     Abdullahi Tariq Patient Status:  Inpatient    5/15/1935 MRN [id number]   [facility] 2NE-A Attending Tiffanie Mariscal,  [facility] Se Day # 3 PCP ABIGAIL GRANDA     Chief Complaint: Dyspnea    S: Pa Epic.    Medications:   • Pantoprazole Sodium  40 mg Oral QAM AC   • guaiFENesin-codeine  5 mL Oral Q6H   • enoxaparin  40 mg Subcutaneous Nightly   • benzonatate  200 mg Oral TID   • atorvastatin  10 mg Oral Nightly   • cefTRIAXone  1 g Intravenous Q24H

## 2019-05-17 ENCOUNTER — APPOINTMENT (OUTPATIENT)
Dept: GENERAL RADIOLOGY | Facility: HOSPITAL | Age: 84
DRG: 177 | End: 2019-05-17
Attending: HOSPITALIST
Payer: COMMERCIAL

## 2019-05-17 PROCEDURE — 99232 SBSQ HOSP IP/OBS MODERATE 35: CPT | Performed by: HOSPITALIST

## 2019-05-17 PROCEDURE — 71045 X-RAY EXAM CHEST 1 VIEW: CPT | Performed by: HOSPITALIST

## 2019-05-17 NOTE — PROGRESS NOTES
Pulmonary Progress Note        NAME: Ervin Impact[employer]keke - ROOM: 4825/1842-R - MRN: [id number] - Age: 81year old - : 5/15/1935        Last 24hrs: No events overnight, feels ok this afternoon    OBJECTIVE:   19  0508 19  0654 19 input(s): ALT, AST, ALB, AMYLASE, LIPASE, LDH in the last 72 hours. Invalid input(s): ALPHOS, TBIL, DBIL, TPROT    Invalid input(s): ARTERIALPH, ARTERIALPO2, ARTERIALPCO2, ARTERIALHCO3    No results for input(s): BNP in the last 72 hours.     Invalid inp Enterovirus  -supportive care  -tessalon for cough  4. Proph-LMWH  - will follow  5.  Dispo- Full code      Justin Perales  [facility] Pulmonary and Critical Care

## 2019-05-17 NOTE — PLAN OF CARE
Assumed care for pt at 19:30 on 5/16    Droplet iso for rhinovirus    A&Ox4, [place] citizen speaking  Tremors in hands less noticeable. Denies pain. Reports improved breathing  BP high, 146/73, after amlodipine started.  Other VSS on 2 L O2, SpO2 93%   Sinus baseline  Description  INTERVENTIONS:  - Continuous cardiac monitoring, monitor vital signs, obtain 12 lead EKG if indicated  - Evaluate effectiveness of antiarrhythmic and heart rate control medications as ordered  - Initiate emergency measures for life t additional Care Plan goals for specific interventions   Outcome: Progressing     Problem: Impaired Activities of Daily Living  Goal: Achieve highest/safest level of independence in self care  Description  Interventions:  - Assess ability and encourage lizeth

## 2019-05-17 NOTE — DIETARY NOTE
[address] [facility]     Admitting diagnosis:  Hyperglycemia [R73.9]  Acute chest pain [R07.9]  Community acquired pneumonia, unspecified laterality [J18.9]    Ht: 165.1 cm (5' 5\")  Wt: 76.5 kg (168 lb 10.4 oz

## 2019-05-17 NOTE — PROGRESS NOTES
[facility]  Progress Note     Shantelle Little Patient Status:  Inpatient    5/15/1935 MRN [id number]   [facility] 2NE-A Attending Gopi Noriega,  [facility] Se Day # 4 PCP ABIGAIL GRANDA     Chief Complaint: Dyspnea    S: Pa hours. Recent Labs   Lab 05/13/19  1200 05/14/19  0600   TROP <0.045 <0.045            Imaging: Imaging data reviewed in Epic.     Medications:   • piperacillin-tazobactam  3.375 g Intravenous Q8H   • Pantoprazole Sodium  40 mg Oral QAM AC   • guaiFENesi

## 2019-05-18 PROCEDURE — 99232 SBSQ HOSP IP/OBS MODERATE 35: CPT | Performed by: HOSPITALIST

## 2019-05-18 RX ORDER — TRAZODONE HYDROCHLORIDE 50 MG/1
25 TABLET ORAL NIGHTLY PRN
Status: DISCONTINUED | OUTPATIENT
Start: 2019-05-18 | End: 2019-05-21

## 2019-05-18 RX ORDER — IPRATROPIUM BROMIDE AND ALBUTEROL SULFATE 2.5; .5 MG/3ML; MG/3ML
3 SOLUTION RESPIRATORY (INHALATION)
Status: DISCONTINUED | OUTPATIENT
Start: 2019-05-19 | End: 2019-05-19

## 2019-05-18 NOTE — PLAN OF CARE
Pt with intermittent O2 use, with mostly clear breath sounds and an occasional wheeze. Breathing treatments given as ordered.     Will continue to monitor

## 2019-05-18 NOTE — PROGRESS NOTES
Pulmonary Progress Note        NAME: Christopher Expose - ROOM: 8808/3115-J - MRN: [id number] - Age: 81year old - : 5/15/1935    Past Medical History:   Diagnosis Date   • Arrhythmia    • Bell's palsy    • [facility]    skin cancer on top distended   Extremities:  no cyanosis or edema.    Neurologic: Oriented  Times 3       Recent Labs   Lab 05/13/19  1200 05/14/19  0600 05/15/19  0553 05/18/19  0614   RBC 4.03 3.56* 3.58* 3.68*   HGB 12.9* 11.4* 11.4* 11.5*   HCT 37.0* 33.5* 34.5* 34.5*   M

## 2019-05-18 NOTE — PLAN OF CARE
He is on 3l/min NC sating 98%. Will lower to 2l/min. He gets Q4 duo tolerating treatments well. BS are clear pre and post treatment.

## 2019-05-18 NOTE — PLAN OF CARE
5/18/2019   Family of pt is requesting a neuro work- up for possible Parkinson's disease d/t to new onset of tremors.  Will notify day shift nurse

## 2019-05-18 NOTE — PROGRESS NOTES
[facility] HOSPITALIST  Progress Note     Shantelle Little Patient Status:  Inpatient    5/15/1935 MRN [id number]   [facility] 2NE-A Attending Gopi Noriega,  [facility] Se Day # 5 PCP ABIGAIL GRANDA     Chief Complaint: Dyspnea    S: Pa input(s): PTP, INR in the last 168 hours. Recent Labs   Lab 05/13/19  1200 05/14/19  0600   TROP <0.045 <0.045            Imaging: Imaging data reviewed in Epic.     Medications:   • piperacillin-tazobactam  3.375 g Intravenous Q8H   • Pantoprazole Sodiu

## 2019-05-19 PROCEDURE — 99232 SBSQ HOSP IP/OBS MODERATE 35: CPT | Performed by: HOSPITALIST

## 2019-05-19 RX ORDER — IPRATROPIUM BROMIDE AND ALBUTEROL SULFATE 2.5; .5 MG/3ML; MG/3ML
3 SOLUTION RESPIRATORY (INHALATION) 2 TIMES DAILY
Status: DISCONTINUED | OUTPATIENT
Start: 2019-05-19 | End: 2019-05-20

## 2019-05-19 RX ORDER — SCOLOPAMINE TRANSDERMAL SYSTEM 1 MG/1
1 PATCH, EXTENDED RELEASE TRANSDERMAL
Status: DISCONTINUED | OUTPATIENT
Start: 2019-05-19 | End: 2019-05-21

## 2019-05-19 NOTE — PROGRESS NOTES
[facility] HOSPITAL[facility]  Progress Note     Dario Ochoa Patient Status:  Inpatient    5/15/1935 MRN [id number]   [facility] 2NE-A Attending Shelley Landry,  [facility] Se Day # 6 PCP ABIGAIL GRANDA     Chief Complaint: Dyspnea    S: Pa (based on SCr of 1.4 mg/dL (H)). No results for input(s): PTP, INR in the last 168 hours. Recent Labs   Lab 05/13/19  1200 05/14/19  0600   TROP <0.045 <0.045            Imaging: Imaging data reviewed in Epic.     Medications:   • ipratropium-albutero

## 2019-05-19 NOTE — PLAN OF CARE
NSR on telemetry. VSS. No c/o cardiac symptoms. On 2L O2 per nasal canula with SPO2 at 90%, unable to wean O2. Receiving breathing treatments per RT. IV antibiotics given as ordered.  maintained. HOB maintained elevated at 30%.  Will continue to monit

## 2019-05-19 NOTE — PLAN OF CARE
Patient is alert and oriented x4. Bhutanese speaking. Translation line used. AF on tele, HR controlled. Oxygenation is 95% on 2 L NC. L sounds crackles bilaterally. VSS. Patient denies chest pain, shortness of breath, palpitations.  Reports pain and discomfor Initiate emergency measures for life threatening arrhythmias  - Monitor electrolytes and administer replacement therapy as ordered  Outcome: Progressing     Problem: Diabetes/Glucose Control  Goal: Glucose maintained within prescribed range  Description  I Assess ability and encourage patient to participate in ADLs to maximize function  - Promote sitting position while performing ADLs such as feeding, grooming, and bathing  - Educate and encourage patient/family in tolerated functional activity level and pre

## 2019-05-20 ENCOUNTER — APPOINTMENT (OUTPATIENT)
Dept: GENERAL RADIOLOGY | Facility: HOSPITAL | Age: 84
DRG: 177 | End: 2019-05-20
Attending: INTERNAL MEDICINE
Payer: COMMERCIAL

## 2019-05-20 PROCEDURE — 71045 X-RAY EXAM CHEST 1 VIEW: CPT | Performed by: INTERNAL MEDICINE

## 2019-05-20 PROCEDURE — 99232 SBSQ HOSP IP/OBS MODERATE 35: CPT | Performed by: HOSPITALIST

## 2019-05-20 RX ORDER — IPRATROPIUM BROMIDE AND ALBUTEROL SULFATE 2.5; .5 MG/3ML; MG/3ML
3 SOLUTION RESPIRATORY (INHALATION) EVERY 4 HOURS PRN
Status: DISCONTINUED | OUTPATIENT
Start: 2019-05-20 | End: 2019-05-21

## 2019-05-20 RX ORDER — CODEINE PHOSPHATE AND GUAIFENESIN 10; 100 MG/5ML; MG/5ML
10 SOLUTION ORAL EVERY 6 HOURS
Status: DISCONTINUED | OUTPATIENT
Start: 2019-05-20 | End: 2019-05-21

## 2019-05-20 NOTE — PROGRESS NOTES
CESARIO [facility] Patient Status:  Inpatient    5/15/1935 MRN [id number]   [facility] 2NE-A Attending Leonel Sewell MD   Hosp Day # 7 PCP ABIGAIL GRANDA     SUBJECTIVE: Pt continues to have cough productive of Oral, Q15 Min PRN **OR** Glucose-Vitamin C (DEX-4) 4-6 GM-MG chewable tab 4 tablet, 4 tablet, Oral, Q15 Min PRN **OR** dextrose 50 % injection 50 mL, 50 mL, Intravenous, Q15 Min PRN **OR** glucose (DEX4) oral liquid 30 g, 30 g, Oral, Q15 Min PRN **OR** Glu

## 2019-05-20 NOTE — PROGRESS NOTES
NSR on telemetry. VSS. No c/o cardiac symptoms. On room air at change of shift with SPO2 maintaining in the mid 90's. SPO2 with sleep dipping to low 80's. 2L O2 per nasal canula applied with SPO2 returning to the 90's.   Receiving breathing treatments pe

## 2019-05-20 NOTE — PLAN OF CARE
Assumed care of patient around 0730. Pt a/o x 4, RA, SR w/ PACs, RA this a.m., O2 walk completed, pt does not require O2 while ambulating. Denies chest pain/discomfort or SOB at present. Poss d/c tomorrow pending respiratory status.  Patient updated on plan Glucose maintained within prescribed range  Description  INTERVENTIONS:  - Monitor Blood Glucose as ordered  - Assess for signs and symptoms of hyperglycemia and hypoglycemia  - Administer ordered medications to maintain glucose within target range  - Asse patient/family in tolerated functional activity level and precautions during self-care     Outcome: Progressing

## 2019-05-20 NOTE — PHYSICAL THERAPY NOTE
PHYSICAL THERAPY TREATMENT NOTE - INPATIENT    Room Number: 4011/7186-N     Session: 2  Number of Visits to Meet Established Goals: 3    Presenting Problem: Community aquired PNA    Problem List  Principal Problem:    Community acquired pneumonia, unspeci patient currently have. ..  -   Turning over in bed (including adjusting bedclothes, sheets and blankets)?: None   -   Sitting down on and standing up from a chair with arms (e.g., wheelchair, bedside commode, etc.): None   -   Moving from lying on back to light within reach;RN aware of session/findings; All patient questions and concerns addressed    ASSESSMENT     Patient currently does not meet criteria for skilled inpatient physical therapy services, however patient will remain on Inpatient Mobility Team

## 2019-05-20 NOTE — PROGRESS NOTES
[facility] H[facility]PITAL[facility]  Progress Note     Gaetano Brothers Patient Status:  Inpatient    5/15/1935 MRN [id number]   [facility] 2NE-A Attending Nancy Arellano, [address] [facility] [address] Se Day # 7 PCP ABIGAIL GRANDA     Chief Complaint: Dyspnea    S: Pa Imaging data reviewed in Epic.     Medications:   • guaiFENesin-codeine  10 mL Oral Q6H   • ipratropium-albuterol  3 mL Nebulization BID   • scopolamine  1 patch Transdermal Q72H   • insulin detemir  10 Units Subcutaneous Nightly   • Insulin Aspart Pen  1-1

## 2019-05-21 VITALS
HEART RATE: 78 BPM | BODY MASS INDEX: 28.72 KG/M2 | TEMPERATURE: 98 F | DIASTOLIC BLOOD PRESSURE: 86 MMHG | WEIGHT: 172.38 LBS | OXYGEN SATURATION: 91 % | HEIGHT: 65 IN | RESPIRATION RATE: 20 BRPM | SYSTOLIC BLOOD PRESSURE: 160 MMHG

## 2019-05-21 PROCEDURE — 99239 HOSP IP/OBS DSCHRG MGMT >30: CPT | Performed by: HOSPITALIST

## 2019-05-21 RX ORDER — ALBUTEROL SULFATE 90 UG/1
1 AEROSOL, METERED RESPIRATORY (INHALATION) EVERY 6 HOURS PRN
Qty: 1 INHALER | Refills: 0 | Status: SHIPPED | OUTPATIENT
Start: 2019-05-21 | End: 2019-05-23

## 2019-05-21 RX ORDER — AMLODIPINE BESYLATE 5 MG/1
5 TABLET ORAL DAILY
Qty: 30 TABLET | Refills: 0 | Status: SHIPPED | OUTPATIENT
Start: 2019-05-22

## 2019-05-21 RX ORDER — AMOXICILLIN AND CLAVULANATE POTASSIUM 875; 125 MG/1; MG/1
1 TABLET, FILM COATED ORAL 2 TIMES DAILY
Qty: 10 TABLET | Refills: 0 | Status: SHIPPED | OUTPATIENT
Start: 2019-05-21 | End: 2019-05-26

## 2019-05-21 RX ORDER — PANTOPRAZOLE SODIUM 40 MG/1
40 TABLET, DELAYED RELEASE ORAL
Qty: 30 TABLET | Refills: 0 | Status: SHIPPED | OUTPATIENT
Start: 2019-05-22 | End: 2019-11-25

## 2019-05-21 RX ORDER — ATORVASTATIN CALCIUM 10 MG/1
10 TABLET, FILM COATED ORAL NIGHTLY
Qty: 30 TABLET | Refills: 0 | Status: SHIPPED | OUTPATIENT
Start: 2019-05-21

## 2019-05-21 RX ORDER — BENZONATATE 200 MG/1
200 CAPSULE ORAL 3 TIMES DAILY PRN
Qty: 40 CAPSULE | Refills: 0 | Status: SHIPPED | OUTPATIENT
Start: 2019-05-21 | End: 2019-11-25

## 2019-05-21 RX ORDER — CODEINE PHOSPHATE AND GUAIFENESIN 10; 100 MG/5ML; MG/5ML
10 SOLUTION ORAL 4 TIMES DAILY PRN
Qty: 1 BOTTLE | Refills: 0 | Status: SHIPPED | OUTPATIENT
Start: 2019-05-21 | End: 2019-11-25

## 2019-05-21 NOTE — PLAN OF CARE
Problem: RESPIRATORY - ADULT  Goal: Achieves optimal ventilation and oxygenation  Description  INTERVENTIONS:  - Assess for changes in respiratory status  - Assess for changes in mentation and behavior  - Position to facilitate oxygenation and minimize r Diabetes/Glucose Control  Goal: Glucose maintained within prescribed range  Description  INTERVENTIONS:  - Monitor Blood Glucose as ordered  - Assess for signs and symptoms of hyperglycemia and hypoglycemia  - Administer ordered medications to maintain glu 5/21/2019 1827 by Singh Tavares RN  Outcome: Adequate for Discharge  5/21/2019 0950 by Singh Tavares RN  Outcome: Progressing     Problem: Impaired Activities of Daily Living  Goal: Achieve highest/safest level of independence in self care  Neto

## 2019-05-21 NOTE — PROGRESS NOTES
NURSING DISCHARGE NOTE    Discharged to home  via w/c  Accompanied by family  Belongings taken with        Pt discharged to home. Discharge instructions including follow up and scripts reviewed with pt and daughter, verbalized understanding.  PIV remove

## 2019-05-21 NOTE — PLAN OF CARE
Assumed care of pt at 0730. Pt A/OX3. O2 saturation in the mid 90s while resting on room air   Cont on nebs tx every 6 hrs per RT  Cont on IV antibiotics   Droplet precautions maintained   Call light within reach. Will cont to monitor.

## 2019-05-21 NOTE — PLAN OF CARE
PLAN OF CARE - SHIFT NOTE    Patient is ANOx4 primarily Cameroonian speaking, on RA with 2L NC @ NOC, tele NSR, cont B/B, up with SBA +walker. Patient is on droplet precautions for +Rhinovirus/enterovirus. Maintained. From home.  Passed swallow eval, on zosyn Q mobility/gait  Description  Interventions:  - Assess patient's functional ability and stability  - Promote increasing activity/tolerance for mobility and gait  - Educate and engage patient/family in tolerated activity level and precautions  - Recommend use doctor  -check blood sugars  As directed by doctor  -follow prescribed diet  - See additional Care Plan goals for specific interventions   Outcome: Progressing  Goal: Patient/Family Short Term Goal  Description  Patient's Short Term Goal: blood sugar  To

## 2019-05-21 NOTE — PLAN OF CARE
Problem: RESPIRATORY - ADULT  Goal: Achieves optimal ventilation and oxygenation  Description  INTERVENTIONS:  - Assess for changes in respiratory status  - Assess for changes in mentation and behavior  - Position to facilitate oxygenation and minimize r nutrition consult as needed  - Instruct patient on self management of diabetes  Outcome: Progressing     Problem: Impaired Swallowing  Goal: Minimize aspiration risk  Description  Interventions:  - Patient should be alert and upright for all feedings (90 d

## 2019-05-21 NOTE — CM/SW NOTE
05/21/19 1340   Discharge disposition   Expected discharge disposition Home-Health   Name of Facillity/Home Care/Hospice Residential     Page to [facility] liaison to notify of d/c today.     Darylene Getting, RN,   Phone [phone]  Pager [phone]

## 2019-05-21 NOTE — PROGRESS NOTES
[facility] Patient Status:  Inpatient    5/15/1935 MRN [id number]   [facility] 2NE-A Attending Hector Mendez MD   Hosp Day # 8 PCP ABIGAIL GRANDA     SUBJECTIVE: Patient denies complaints.   He was up am Glucose-Vitamin C (DEX-4) 4-6 GM-MG chewable tab 4 tablet, 4 tablet, Oral, Q15 Min PRN **OR** dextrose 50 % injection 50 mL, 50 mL, Intravenous, Q15 Min PRN **OR** glucose (DEX4) oral liquid 30 g, 30 g, Oral, Q15 Min PRN **OR** Glucose-Vitamin C (DEX-4) 4- weeks

## 2019-05-22 ENCOUNTER — TELEPHONE (OUTPATIENT)
Dept: CASE MANAGEMENT | Age: 84
End: 2019-05-22

## 2019-05-22 NOTE — DISCHARGE SUMMARY
[facility] HOSPITALIST  DISCHARGE SUMMARY     Dariusz Phillip Patient Status:  Inpatient    5/15/1935 MRN [id number]   [facility] 2NE-A Attending No att. providers found   Hosp Day # 8 PCP ABIGAIL GRANDA     Date of Admission:  initially to have possibly had a.fib, but seen by cards and thought was more compatible with sinus with pacs. Per dr. Shayna Saini, patient did not have sustained afib. Lace+ Score: 75  59-90 High Risk  29-58 Medium Risk  0-28   Low Risk.     TCM Follow-Up Re Prescription details   aspirin 81 MG Tbec      Take 81 mg by mouth daily. Refills:  0     metFORMIN HCl 1000 MG Tabs  Commonly known as:  GLUCOPHAGE      Take 1,000 mg by mouth 2 (two) times daily with meals.    Refills:  0        STOP taking these medica -----------------------------------------------------------------------------------------------  PATIENT DISCHARGE INSTRUCTIONS: See electronic chart    Anuj Bai MD    Time spent:  > 30 minutes

## 2019-05-22 NOTE — PROGRESS NOTES
Update: Pt's son called back to schedule appt at [facility].   Set for 5/23 at 9:30am.    LMOVM to set up a HFU at the [facility], per post-dc order

## 2019-05-23 ENCOUNTER — OFFICE VISIT (OUTPATIENT)
Dept: INTERNAL MEDICINE CLINIC | Facility: CLINIC | Age: 84
End: 2019-05-23
Payer: COMMERCIAL

## 2019-05-23 ENCOUNTER — PATIENT OUTREACH (OUTPATIENT)
Dept: CASE MANAGEMENT | Age: 84
End: 2019-05-23

## 2019-05-23 VITALS
RESPIRATION RATE: 16 BRPM | WEIGHT: 171 LBS | BODY MASS INDEX: 28 KG/M2 | HEART RATE: 80 BPM | SYSTOLIC BLOOD PRESSURE: 140 MMHG | OXYGEN SATURATION: 90 % | DIASTOLIC BLOOD PRESSURE: 80 MMHG

## 2019-05-23 DIAGNOSIS — E11.9 TYPE 2 DIABETES MELLITUS WITHOUT COMPLICATION, WITHOUT LONG-TERM CURRENT USE OF INSULIN (HCC): ICD-10-CM

## 2019-05-23 DIAGNOSIS — B34.1 ENTEROVIRUS INFECTION, UNSPECIFIED: ICD-10-CM

## 2019-05-23 DIAGNOSIS — I67.9 CEREBROVASCULAR DISEASE: ICD-10-CM

## 2019-05-23 DIAGNOSIS — J20.8 VIRAL BRONCHITIS: Primary | ICD-10-CM

## 2019-05-23 DIAGNOSIS — Z09 HOSPITAL DISCHARGE FOLLOW-UP: ICD-10-CM

## 2019-05-23 DIAGNOSIS — I25.10 CORONARY ARTERY DISEASE INVOLVING NATIVE CORONARY ARTERY OF NATIVE HEART WITHOUT ANGINA PECTORIS: ICD-10-CM

## 2019-05-23 DIAGNOSIS — E11.22 CKD STAGE 3 DUE TO TYPE 2 DIABETES MELLITUS (HCC): ICD-10-CM

## 2019-05-23 DIAGNOSIS — I10 ESSENTIAL HYPERTENSION: ICD-10-CM

## 2019-05-23 DIAGNOSIS — J18.9 COMMUNITY ACQUIRED PNEUMONIA, UNSPECIFIED LATERALITY: ICD-10-CM

## 2019-05-23 DIAGNOSIS — E78.5 DYSLIPIDEMIA: ICD-10-CM

## 2019-05-23 DIAGNOSIS — B34.8 RHINOVIRUS: ICD-10-CM

## 2019-05-23 DIAGNOSIS — N18.30 CKD STAGE 3 DUE TO TYPE 2 DIABETES MELLITUS (HCC): ICD-10-CM

## 2019-05-23 PROCEDURE — 99205 OFFICE O/P NEW HI 60 MIN: CPT | Performed by: CLINICAL NURSE SPECIALIST

## 2019-05-23 PROCEDURE — 1111F DSCHRG MED/CURRENT MED MERGE: CPT | Performed by: CLINICAL NURSE SPECIALIST

## 2019-05-23 RX ORDER — ALBUTEROL SULFATE 2.5 MG/3ML
2.5 SOLUTION RESPIRATORY (INHALATION) EVERY 4 HOURS PRN
Qty: 540 ML | Refills: 0 | Status: SHIPPED | OUTPATIENT
Start: 2019-05-23 | End: 2019-11-25

## 2019-05-23 NOTE — PROGRESS NOTES
[address]  TRANSITIONAL CARE CLINIC      HISTORY   CHIEF COMPLAINT: post hospital follow up visit  HPI: Juan Pablo Malloy is a 81year old male here today for follow up after being hospitalized for chest pain, SOB, epigastric abd Sodium 40 MG Oral Tab EC Take 1 tablet (40 mg total) by mouth every morning before breakfast. Disp: 30 tablet Rfl: 0   Amoxicillin-Pot Clavulanate 875-125 MG Oral Tab Take 1 tablet by mouth 2 (two) times daily for 5 days.  Disp: 10 tablet Rfl: 0   Albuterol Abdomen+pelvis(cpt=74176)    Result Date: 5/13/2019  CONCLUSION:  1. There is bibasilar lung consolidation. Correlation for pneumonia recommended.  2. The infiltrative abnormality previously noted extending from the medial upper pole of the left kidney has Results   Component Value Date/Time    WBC 6.0 05/18/2019 06:14 AM    HGB 11.5 (L) 05/18/2019 06:14 AM    HCT 34.5 (L) 05/18/2019 06:14 AM    .0 05/18/2019 06:14 AM     (H) 05/15/2019 05:53 AM     05/15/2019 05:53 AM    K 4.2 05/16/2019 coronary artery of native heart without angina pectoris/s/p PCI  · CPM with ASA  · Follow up appointment 6/6/19  · Recommendation is stress test; scheduled for 1030 on 6/6/19  3.  Essential hypertension  · CPM with amlodipine  · Will follow up with [facility] PCP 05/15/1937  Pneumococcal PPSV23/PCV13 65+ Years / Low and Medium Risk(1 of 2 - PCV13) due on 05/15/2001  LDL Control due on 08/09/2019  Influenza Vaccine(Season Ended) due on 09/01/2019  Annual Depression Screen due on 05/13/2020  Fall Risk Screening due o

## 2019-05-23 NOTE — PATIENT INSTRUCTIONS
Patient Instructions:  1. Please  your Albuterol inhaler and begin using it as prescribed  2. Use your incentive spirometer every hour  3.  Follow up with Dr. Allison Monroe (cardiology) on 6/6 for stress test at 10:30 am     [address] [place] [address]

## 2019-05-23 NOTE — PROGRESS NOTES
TRANSITIONAL CARE CLINIC PHARMACIST MEDICATION RECONCILIATION        Ervin Alcaraz MRN [id number]    5/15/1935 PCP ABIGAIL Cavazos       Comments: Medication history completed in [address] [place] [address] by pharmacist with patient's son.  So needed for Wheezing. No facility-administered encounter medications on file as of 5/23/2019. Immunizations Assessed: Patient is a candidate for both pneumonia vaccines. Encouraged him to discuss with PCP at upcoming appointment.      Medication Adh

## 2019-06-05 ENCOUNTER — NURSE ONLY (OUTPATIENT)
Dept: ENDOCRINOLOGY CLINIC | Facility: CLINIC | Age: 84
End: 2019-06-05
Payer: COMMERCIAL

## 2019-06-05 VITALS — BODY MASS INDEX: 27.48 KG/M2 | HEIGHT: 66 IN | WEIGHT: 171 LBS

## 2019-06-05 DIAGNOSIS — Z79.4 TYPE 2 DIABETES MELLITUS WITH HYPERGLYCEMIA, WITH LONG-TERM CURRENT USE OF INSULIN (HCC): Primary | ICD-10-CM

## 2019-06-05 DIAGNOSIS — E11.65 TYPE 2 DIABETES MELLITUS WITH HYPERGLYCEMIA, WITH LONG-TERM CURRENT USE OF INSULIN (HCC): Primary | ICD-10-CM

## 2019-06-05 PROCEDURE — G0108 DIAB MANAGE TRN  PER INDIV: HCPCS

## 2019-07-03 ENCOUNTER — HOSPITAL ENCOUNTER (OUTPATIENT)
Dept: GENERAL RADIOLOGY | Age: 84
Discharge: HOME | End: 2019-07-03
Attending: INTERNAL MEDICINE
Payer: COMMERCIAL

## 2019-07-03 DIAGNOSIS — J18.1 LOBAR PNEUMONIA (HCC): ICD-10-CM

## 2019-07-03 PROCEDURE — 71046 X-RAY EXAM CHEST 2 VIEWS: CPT | Performed by: INTERNAL MEDICINE

## 2019-07-03 NOTE — PROGRESS NOTES
Please let pt know that her x-ray shows a persistent elevation of her left hemidiaphragm- nothing to worry about; and an improvement in her right lung base. The pneumonia is clearing up. Would recommend repeat x-ray in 3 mths.   thanks

## 2019-08-14 ENCOUNTER — LAB ENCOUNTER (OUTPATIENT)
Dept: LAB | Age: 84
End: 2019-08-14
Attending: NURSE PRACTITIONER
Payer: COMMERCIAL

## 2019-08-14 DIAGNOSIS — E11.65 TYPE II DIABETES MELLITUS, UNCONTROLLED (HCC): ICD-10-CM

## 2019-08-14 DIAGNOSIS — E78.5 HYPERLIPEMIA: ICD-10-CM

## 2019-08-14 DIAGNOSIS — I10 ESSENTIAL HYPERTENSION, MALIGNANT: ICD-10-CM

## 2019-08-14 DIAGNOSIS — N18.30 CHRONIC KIDNEY DISEASE, STAGE III (MODERATE) (HCC): ICD-10-CM

## 2019-08-14 DIAGNOSIS — Z12.11 SPECIAL SCREENING FOR MALIGNANT NEOPLASMS, COLON: Primary | ICD-10-CM

## 2019-08-14 LAB
ALBUMIN SERPL-MCNC: 3.4 G/DL (ref 3.4–5)
ALBUMIN/GLOB SERPL: 0.9 {RATIO} (ref 1–2)
ALP LIVER SERPL-CCNC: 138 U/L (ref 45–117)
ALT SERPL-CCNC: 22 U/L (ref 16–61)
ANION GAP SERPL CALC-SCNC: 5 MMOL/L (ref 0–18)
AST SERPL-CCNC: 24 U/L (ref 15–37)
BASOPHILS # BLD AUTO: 0.07 X10(3) UL (ref 0–0.2)
BASOPHILS NFR BLD AUTO: 1.1 %
BILIRUB SERPL-MCNC: 0.4 MG/DL (ref 0.1–2)
BUN BLD-MCNC: 25 MG/DL (ref 7–18)
BUN/CREAT SERPL: 18.1 (ref 10–20)
CALCIUM BLD-MCNC: 8.6 MG/DL (ref 8.5–10.1)
CHLORIDE SERPL-SCNC: 111 MMOL/L (ref 98–112)
CHOLEST SMN-MCNC: 142 MG/DL (ref ?–200)
CO2 SERPL-SCNC: 26 MMOL/L (ref 21–32)
CREAT BLD-MCNC: 1.38 MG/DL (ref 0.7–1.3)
CREAT UR-SCNC: 79.6 MG/DL
DEPRECATED RDW RBC AUTO: 40.2 FL (ref 35.1–46.3)
EOSINOPHIL # BLD AUTO: 0.28 X10(3) UL (ref 0–0.7)
EOSINOPHIL NFR BLD AUTO: 4.5 %
ERYTHROCYTE [DISTWIDTH] IN BLOOD BY AUTOMATED COUNT: 11.6 % (ref 11–15)
GLOBULIN PLAS-MCNC: 4 G/DL (ref 2.8–4.4)
GLUCOSE BLD-MCNC: 102 MG/DL (ref 70–99)
HCT VFR BLD AUTO: 39.8 % (ref 39–53)
HDLC SERPL-MCNC: 51 MG/DL (ref 40–59)
HGB BLD-MCNC: 13.1 G/DL (ref 13–17.5)
IMM GRANULOCYTES # BLD AUTO: 0.01 X10(3) UL (ref 0–1)
IMM GRANULOCYTES NFR BLD: 0.2 %
LDLC SERPL CALC-MCNC: 72 MG/DL (ref ?–100)
LYMPHOCYTES # BLD AUTO: 1.98 X10(3) UL (ref 1–4)
LYMPHOCYTES NFR BLD AUTO: 31.8 %
M PROTEIN MFR SERPL ELPH: 7.4 G/DL (ref 6.4–8.2)
MCH RBC QN AUTO: 31.3 PG (ref 26–34)
MCHC RBC AUTO-ENTMCNC: 32.9 G/DL (ref 31–37)
MCV RBC AUTO: 95.2 FL (ref 80–100)
MICROALBUMIN UR-MCNC: 26.3 MG/DL
MICROALBUMIN/CREAT 24H UR-RTO: 330.4 UG/MG (ref ?–30)
MONOCYTES # BLD AUTO: 0.58 X10(3) UL (ref 0.1–1)
MONOCYTES NFR BLD AUTO: 9.3 %
NEUTROPHILS # BLD AUTO: 3.31 X10 (3) UL (ref 1.5–7.7)
NEUTROPHILS # BLD AUTO: 3.31 X10(3) UL (ref 1.5–7.7)
NEUTROPHILS NFR BLD AUTO: 53.1 %
NONHDLC SERPL-MCNC: 91 MG/DL (ref ?–130)
OSMOLALITY SERPL CALC.SUM OF ELEC: 299 MOSM/KG (ref 275–295)
PLATELET # BLD AUTO: 243 10(3)UL (ref 150–450)
POTASSIUM SERPL-SCNC: 4.1 MMOL/L (ref 3.5–5.1)
RBC # BLD AUTO: 4.18 X10(6)UL (ref 3.8–5.8)
SODIUM SERPL-SCNC: 142 MMOL/L (ref 136–145)
TRIGL SERPL-MCNC: 97 MG/DL (ref 30–149)
TSI SER-ACNC: 0.96 MIU/ML (ref 0.36–3.74)
VLDLC SERPL CALC-MCNC: 19 MG/DL (ref 0–30)
WBC # BLD AUTO: 6.2 X10(3) UL (ref 4–11)

## 2019-08-14 PROCEDURE — 36415 COLL VENOUS BLD VENIPUNCTURE: CPT

## 2019-08-14 PROCEDURE — 84443 ASSAY THYROID STIM HORMONE: CPT

## 2019-08-14 PROCEDURE — 80053 COMPREHEN METABOLIC PANEL: CPT

## 2019-08-14 PROCEDURE — 82043 UR ALBUMIN QUANTITATIVE: CPT

## 2019-08-14 PROCEDURE — 80061 LIPID PANEL: CPT

## 2019-08-14 PROCEDURE — 85025 COMPLETE CBC W/AUTO DIFF WBC: CPT

## 2019-08-14 PROCEDURE — 82570 ASSAY OF URINE CREATININE: CPT

## 2019-08-19 RX ORDER — FLASH GLUCOSE SENSOR
KIT MISCELLANEOUS
Qty: 3 EACH | Refills: 1 | Status: SHIPPED | OUTPATIENT
Start: 2019-08-19 | End: 2019-11-12

## 2019-09-11 ENCOUNTER — NURSE ONLY (OUTPATIENT)
Dept: ENDOCRINOLOGY CLINIC | Facility: CLINIC | Age: 84
End: 2019-09-11
Payer: COMMERCIAL

## 2019-09-11 VITALS — HEIGHT: 66 IN | BODY MASS INDEX: 27.64 KG/M2 | WEIGHT: 172 LBS

## 2019-09-11 DIAGNOSIS — E11.65 TYPE 2 DIABETES MELLITUS WITH HYPERGLYCEMIA, WITH LONG-TERM CURRENT USE OF INSULIN (HCC): Primary | ICD-10-CM

## 2019-09-11 DIAGNOSIS — Z79.4 TYPE 2 DIABETES MELLITUS WITH HYPERGLYCEMIA, WITH LONG-TERM CURRENT USE OF INSULIN (HCC): Primary | ICD-10-CM

## 2019-09-11 PROCEDURE — G0108 DIAB MANAGE TRN  PER INDIV: HCPCS

## 2019-09-12 NOTE — PROGRESS NOTES
Dariusz Phillip  [id number]/63/[id number] was seen for Continuous Glucose Sensor Follow-up Visit:    Date: 9/12/2019  Referring Provider: Nico Woods  Start time: 1pm End time: 2pm    Sensor Type: Selene 14 day personal    Site Assessment: left side of arm and

## 2019-10-02 ENCOUNTER — HOSPITAL ENCOUNTER (OUTPATIENT)
Dept: GENERAL RADIOLOGY | Age: 84
Discharge: HOME | End: 2019-10-02
Attending: INTERNAL MEDICINE
Payer: COMMERCIAL

## 2019-10-02 DIAGNOSIS — J18.1 LOBAR PNEUMONIA (HCC): ICD-10-CM

## 2019-10-02 PROCEDURE — 71046 X-RAY EXAM CHEST 2 VIEWS: CPT | Performed by: INTERNAL MEDICINE

## 2019-10-08 NOTE — PROGRESS NOTES
Please let pt know that her x-ray looks improved. I would like her to get a repeat x-ray in 6mths.   Thanks

## 2019-11-12 RX ORDER — FLASH GLUCOSE SENSOR
KIT MISCELLANEOUS
Qty: 2 EACH | Refills: 3 | Status: SHIPPED | OUTPATIENT
Start: 2019-11-12 | End: 2020-03-16

## 2019-11-27 ENCOUNTER — LAB ENCOUNTER (OUTPATIENT)
Dept: LAB | Age: 84
End: 2019-11-27
Attending: NURSE PRACTITIONER
Payer: COMMERCIAL

## 2019-11-27 DIAGNOSIS — R80.9 PROTEINURIA: Primary | ICD-10-CM

## 2019-11-27 PROCEDURE — 82570 ASSAY OF URINE CREATININE: CPT

## 2019-11-27 PROCEDURE — 82043 UR ALBUMIN QUANTITATIVE: CPT

## 2019-12-11 ENCOUNTER — NURSE ONLY (OUTPATIENT)
Dept: ENDOCRINOLOGY CLINIC | Facility: CLINIC | Age: 84
End: 2019-12-11
Payer: COMMERCIAL

## 2019-12-11 VITALS — BODY MASS INDEX: 28.42 KG/M2 | HEIGHT: 64 IN | WEIGHT: 166.5 LBS

## 2019-12-11 DIAGNOSIS — Z79.4 TYPE 2 DIABETES MELLITUS WITH HYPERGLYCEMIA, WITH LONG-TERM CURRENT USE OF INSULIN (HCC): Primary | ICD-10-CM

## 2019-12-11 DIAGNOSIS — E11.65 TYPE 2 DIABETES MELLITUS WITH HYPERGLYCEMIA, WITH LONG-TERM CURRENT USE OF INSULIN (HCC): Primary | ICD-10-CM

## 2019-12-11 PROCEDURE — 83036 HEMOGLOBIN GLYCOSYLATED A1C: CPT

## 2019-12-11 PROCEDURE — G0108 DIAB MANAGE TRN  PER INDIV: HCPCS

## 2019-12-11 NOTE — PROGRESS NOTES
Inderjitmary anne Nayeli  [id number]/[id number] was seen for Continuous Glucose Sensor Follow-up Visit:    Date: 12/11/2019  Referring Provider: Denisse Worthington  Start time: 10am End time: 11am    Sensor Type: Roman personal 14 day    Site Assessment: left arm and it i

## 2019-12-11 NOTE — PATIENT INSTRUCTIONS
1. Siga con el Glipizide 5 mg en la manana  2. [place] el Metformin 1000 2 tabletas en la manana y nada por la noche. Coma el desayuno senthil para evitar el asco.  3.  Escane deleon sensor antes de dormir para no perder senal en la noche.   4.  Trate el nivel b

## 2019-12-30 ENCOUNTER — HOSPITAL ENCOUNTER (OUTPATIENT)
Dept: CV DIAGNOSTICS | Facility: HOSPITAL | Age: 84
Discharge: HOME | End: 2019-12-30
Attending: INTERNAL MEDICINE
Payer: COMMERCIAL

## 2019-12-30 DIAGNOSIS — I10 ESSENTIAL HYPERTENSION: ICD-10-CM

## 2019-12-30 DIAGNOSIS — I25.10 ATHEROSCLEROSIS OF NATIVE CORONARY ARTERY OF NATIVE HEART, ANGINA PRESENCE UNSPECIFIED: ICD-10-CM

## 2019-12-30 DIAGNOSIS — R06.00 EXERTIONAL DYSPNEA: ICD-10-CM

## 2019-12-30 DIAGNOSIS — E78.00 PURE HYPERCHOLESTEROLEMIA: ICD-10-CM

## 2019-12-30 PROCEDURE — 93017 CV STRESS TEST TRACING ONLY: CPT | Performed by: INTERNAL MEDICINE

## 2019-12-30 PROCEDURE — 93018 CV STRESS TEST I&R ONLY: CPT | Performed by: INTERNAL MEDICINE

## 2019-12-30 PROCEDURE — 78452 HT MUSCLE IMAGE SPECT MULT: CPT | Performed by: INTERNAL MEDICINE

## 2020-03-16 RX ORDER — FLASH GLUCOSE SENSOR
KIT MISCELLANEOUS
Qty: 2 EACH | Refills: 3 | Status: SHIPPED | OUTPATIENT
Start: 2020-03-16 | End: 2020-07-23

## 2020-03-18 ENCOUNTER — NURSE ONLY (OUTPATIENT)
Dept: ENDOCRINOLOGY CLINIC | Facility: CLINIC | Age: 85
End: 2020-03-18
Payer: COMMERCIAL

## 2020-03-18 VITALS — WEIGHT: 166 LBS | BODY MASS INDEX: 28.34 KG/M2 | HEIGHT: 64 IN

## 2020-03-18 DIAGNOSIS — E11.65 TYPE 2 DIABETES MELLITUS WITH HYPERGLYCEMIA, WITH LONG-TERM CURRENT USE OF INSULIN (HCC): Primary | ICD-10-CM

## 2020-03-18 DIAGNOSIS — Z79.4 TYPE 2 DIABETES MELLITUS WITH HYPERGLYCEMIA, WITH LONG-TERM CURRENT USE OF INSULIN (HCC): Primary | ICD-10-CM

## 2020-03-18 LAB
CARTRIDGE LOT#: 588 NUMERIC
HEMOGLOBIN A1C: 7.7 % (ref 4.3–5.6)

## 2020-03-18 PROCEDURE — G0108 DIAB MANAGE TRN  PER INDIV: HCPCS

## 2020-03-18 PROCEDURE — 83036 HEMOGLOBIN GLYCOSYLATED A1C: CPT

## 2020-03-18 NOTE — PROGRESS NOTES
Juan Pablo Malloy  [id number] was seen for Continuous Glucose Sensor Follow-up Visit:    Date: 3/18/2020  Referring Provider: Jamal Galloway  Start time: 10am End time: 11am    Sensor Type: Xander Castro Personal    Site Assessment: left arm and it is free of redn

## 2020-05-26 ENCOUNTER — HOSPITAL ENCOUNTER (OUTPATIENT)
Dept: GENERAL RADIOLOGY | Age: 85
Discharge: HOME | End: 2020-05-26
Attending: INTERNAL MEDICINE
Payer: COMMERCIAL

## 2020-05-26 DIAGNOSIS — J18.1 LOBAR PNEUMONIA (HCC): ICD-10-CM

## 2020-05-26 PROCEDURE — 71046 X-RAY EXAM CHEST 2 VIEWS: CPT | Performed by: INTERNAL MEDICINE

## 2020-06-02 NOTE — PROGRESS NOTES
Notified pt sheri Khan of results as okay to share [facility] and willing to take results. Advised no further imaging recommended at this time. Pt son verbalized understanding.

## 2020-07-09 ENCOUNTER — LAB ENCOUNTER (OUTPATIENT)
Dept: LAB | Age: 85
End: 2020-07-09
Attending: NURSE PRACTITIONER
Payer: COMMERCIAL

## 2020-07-09 DIAGNOSIS — E11.9 DIABETES MELLITUS (HCC): Primary | ICD-10-CM

## 2020-07-09 DIAGNOSIS — R80.9 PROTEINURIA: ICD-10-CM

## 2020-07-09 LAB
ALBUMIN SERPL-MCNC: 3.6 G/DL (ref 3.4–5)
ALBUMIN/GLOB SERPL: 0.9 {RATIO} (ref 1–2)
ALP LIVER SERPL-CCNC: 133 U/L (ref 45–117)
ALT SERPL-CCNC: 24 U/L (ref 16–61)
ANION GAP SERPL CALC-SCNC: 5 MMOL/L (ref 0–18)
AST SERPL-CCNC: 20 U/L (ref 15–37)
BILIRUB SERPL-MCNC: 0.6 MG/DL (ref 0.1–2)
BUN BLD-MCNC: 27 MG/DL (ref 7–18)
BUN/CREAT SERPL: 17.5 (ref 10–20)
CALCIUM BLD-MCNC: 8.8 MG/DL (ref 8.5–10.1)
CHLORIDE SERPL-SCNC: 112 MMOL/L (ref 98–112)
CO2 SERPL-SCNC: 26 MMOL/L (ref 21–32)
CREAT BLD-MCNC: 1.54 MG/DL (ref 0.7–1.3)
CREAT UR-SCNC: 221 MG/DL
GGT SERPL-CCNC: 31 U/L (ref 15–85)
GLOBULIN PLAS-MCNC: 3.8 G/DL (ref 2.8–4.4)
GLUCOSE BLD-MCNC: 202 MG/DL (ref 70–99)
M PROTEIN MFR SERPL ELPH: 7.4 G/DL (ref 6.4–8.2)
MICROALBUMIN UR-MCNC: 19.1 MG/DL
MICROALBUMIN/CREAT 24H UR-RTO: 86.4 UG/MG (ref ?–30)
OSMOLALITY SERPL CALC.SUM OF ELEC: 307 MOSM/KG (ref 275–295)
PATIENT FASTING Y/N/NP: YES
POTASSIUM SERPL-SCNC: 4.7 MMOL/L (ref 3.5–5.1)
SODIUM SERPL-SCNC: 143 MMOL/L (ref 136–145)

## 2020-07-09 PROCEDURE — 82570 ASSAY OF URINE CREATININE: CPT

## 2020-07-09 PROCEDURE — 82043 UR ALBUMIN QUANTITATIVE: CPT

## 2020-07-09 PROCEDURE — 82977 ASSAY OF GGT: CPT

## 2020-07-09 PROCEDURE — 36415 COLL VENOUS BLD VENIPUNCTURE: CPT

## 2020-07-09 PROCEDURE — 80053 COMPREHEN METABOLIC PANEL: CPT

## 2020-07-10 ENCOUNTER — LAB ENCOUNTER (OUTPATIENT)
Dept: LAB | Age: 85
End: 2020-07-10
Attending: NURSE PRACTITIONER
Payer: COMMERCIAL

## 2020-07-10 DIAGNOSIS — I10 ESSENTIAL HYPERTENSION, MALIGNANT: ICD-10-CM

## 2020-07-10 DIAGNOSIS — E11.9 DIABETES MELLITUS (HCC): ICD-10-CM

## 2020-07-10 DIAGNOSIS — R80.9 PROTEINURIA: ICD-10-CM

## 2020-07-10 DIAGNOSIS — E78.5 HYPERLIPEMIA: ICD-10-CM

## 2020-07-10 DIAGNOSIS — E11.65 TYPE II DIABETES MELLITUS, UNCONTROLLED (HCC): ICD-10-CM

## 2020-07-10 DIAGNOSIS — N18.30 CHRONIC KIDNEY DISEASE, STAGE III (MODERATE) (HCC): ICD-10-CM

## 2020-07-10 DIAGNOSIS — Z12.11 SPECIAL SCREENING FOR MALIGNANT NEOPLASMS, COLON: ICD-10-CM

## 2020-07-10 PROCEDURE — 82272 OCCULT BLD FECES 1-3 TESTS: CPT

## 2020-07-10 PROCEDURE — 82274 ASSAY TEST FOR BLOOD FECAL: CPT

## 2020-07-10 PROCEDURE — 82270 OCCULT BLOOD FECES: CPT

## 2020-07-23 RX ORDER — FLASH GLUCOSE SENSOR
KIT MISCELLANEOUS
Qty: 2 EACH | Refills: 3 | Status: SHIPPED | OUTPATIENT
Start: 2020-07-23 | End: 2021-01-04

## 2020-07-23 NOTE — TELEPHONE ENCOUNTER
Last VISIT ?? Last REFILL 03/16/2020 qty 2 w/3 refills    Last LABS 07/10/20 FOBT done    No future appointments. Per PROTOCOL? Failed    Please Approve or Deny.

## 2021-01-04 RX ORDER — FLASH GLUCOSE SENSOR
KIT MISCELLANEOUS
Qty: 2 EACH | Refills: 3 | Status: SHIPPED | OUTPATIENT
Start: 2021-01-04

## 2021-01-04 NOTE — TELEPHONE ENCOUNTER
Last VISIT None    Last REFILL 07/23/20 qty 2 w/3 refills    Last LABS n/a    No future appointments. Per PROTOCOL? Failed    Please Approve or Deny.

## 2021-01-07 ENCOUNTER — LAB ENCOUNTER (OUTPATIENT)
Dept: LAB | Age: 86
End: 2021-01-07
Attending: FAMILY MEDICINE
Payer: COMMERCIAL

## 2021-01-07 DIAGNOSIS — E11.65 TYPE II DIABETES MELLITUS, UNCONTROLLED (HCC): Primary | ICD-10-CM

## 2021-01-07 DIAGNOSIS — N18.30 CHRONIC KIDNEY DISEASE, STAGE III (MODERATE) (HCC): ICD-10-CM

## 2021-01-07 LAB
ALBUMIN SERPL-MCNC: 3.6 G/DL (ref 3.4–5)
ALBUMIN/GLOB SERPL: 1 {RATIO} (ref 1–2)
ALP LIVER SERPL-CCNC: 132 U/L
ALT SERPL-CCNC: 31 U/L
ANION GAP SERPL CALC-SCNC: 6 MMOL/L (ref 0–18)
AST SERPL-CCNC: 27 U/L (ref 15–37)
BILIRUB SERPL-MCNC: 0.6 MG/DL (ref 0.1–2)
BUN BLD-MCNC: 31 MG/DL (ref 7–18)
BUN/CREAT SERPL: 21.4 (ref 10–20)
CALCIUM BLD-MCNC: 9.2 MG/DL (ref 8.5–10.1)
CHLORIDE SERPL-SCNC: 110 MMOL/L (ref 98–112)
CO2 SERPL-SCNC: 26 MMOL/L (ref 21–32)
CREAT BLD-MCNC: 1.45 MG/DL
EST. AVERAGE GLUCOSE BLD GHB EST-MCNC: 189 MG/DL (ref 68–126)
GLOBULIN PLAS-MCNC: 3.5 G/DL (ref 2.8–4.4)
GLUCOSE BLD-MCNC: 75 MG/DL (ref 70–99)
HBA1C MFR BLD HPLC: 8.2 % (ref ?–5.7)
M PROTEIN MFR SERPL ELPH: 7.1 G/DL (ref 6.4–8.2)
OSMOLALITY SERPL CALC.SUM OF ELEC: 299 MOSM/KG (ref 275–295)
POTASSIUM SERPL-SCNC: 4.5 MMOL/L (ref 3.5–5.1)
SODIUM SERPL-SCNC: 142 MMOL/L (ref 136–145)

## 2021-01-07 PROCEDURE — 80053 COMPREHEN METABOLIC PANEL: CPT

## 2021-01-07 PROCEDURE — 36415 COLL VENOUS BLD VENIPUNCTURE: CPT

## 2021-01-07 PROCEDURE — 83036 HEMOGLOBIN GLYCOSYLATED A1C: CPT

## 2021-01-23 ENCOUNTER — TELEPHONE (OUTPATIENT)
Dept: FAMILY MEDICINE CLINIC | Facility: CLINIC | Age: 86
End: 2021-01-23

## 2021-01-23 DIAGNOSIS — Z02.9 ENCOUNTER FOR ADMINISTRATIVE EXAMINATIONS: Primary | ICD-10-CM

## 2021-01-23 NOTE — TELEPHONE ENCOUNTER
Incoming call-looking for lab results drawn 1/7. States PCP has not received them. Unable to forward results via epic. Contacted lab at [facility], they will refax to PCP.

## 2021-12-31 ENCOUNTER — IMMUNIZATION (OUTPATIENT)
Dept: LAB | Facility: HOSPITAL | Age: 86
End: 2021-12-31
Attending: EMERGENCY MEDICINE
Payer: COMMERCIAL

## 2021-12-31 DIAGNOSIS — Z23 NEED FOR VACCINATION: Primary | ICD-10-CM

## 2021-12-31 PROCEDURE — 0004A SARSCOV2 VAC 30MCG/0.3ML IM: CPT

## 2023-09-06 NOTE — PLAN OF CARE
Problem: RESPIRATORY - ADULT  Goal: Achieves optimal ventilation and oxygenation  Description  INTERVENTIONS:  - Assess for changes in respiratory status  - Assess for changes in mentation and behavior  - Position to facilitate oxygenation and minimize r CTAP w/ omental carcinomatosis and liver metastases, as well as apparent colonic invasion  -Consult Oncology in AM  -f/ue Gastrin, CEA, AFP, CA CTAP w/ omental carcinomatosis and liver metastases, as well as apparent colonic invasion.   -Given hx of PUD symptoms and persistent diarrhea, potentially gastinoma, though very rare     -f/u serum Gastrin  -Consult Oncology in AM  -f/u CEA, AFP, CA 19-19, HCG CTAP w/ omental carcinomatosis and liver metastases, as well as apparent colonic invasion.   -Given hx of PUD symptoms and persistent diarrhea, potentially gastinoma, though very rare     -f/u serum Gastrin  -Consult Oncology in AM CTAP w/ omental carcinomatosis and liver metastases, as well as apparent colonic invasion.   -Given hx of PUD symptoms and persistent diarrhea, potentially gastrinoma, though very rare    -f/u serum Gastrin  -Formal Oncology c/s in AM

## 2024-03-19 NOTE — PHYSICAL THERAPY NOTE
PHYSICAL THERAPY EVALUATION - INPATIENT     Room Number: 3383/8991-O  Evaluation Date: 5/14/2019  Type of Evaluation: Initial  Physician Order: PT Eval and Treat    Presenting Problem: Community aquired PNA  Reason for Therapy: Mobility Dysfunction a pt up and is prepared for PT session-    no new c/o's and pt is motivated to improve.     jimi in place and functioning,  to be removed next visit  pt has not yet scheduled f/u with the surgeon.      pt was compliant throughout PT session but with significant limitations and noted weakness. pt was educated today on proper sequencing during gait trg with the cane,  and to improve R side knee flex/extension and to heel strike.     pt was educated on rpoper form with hep review,  needing cues to properly contract/hold and to perform in the correct plane         plan for next session-  cont PT with gait trg, hep review,  rom activities Hyperglycemia    Acute chest pain      Past Medical History  Past Medical History:   Diagnosis Date   • Arrhythmia    • Bell's palsy    • Cancer ([facility] 2014    skin cancer on top of head, removed    • Coronary atherosclerosis    • Esophageal reflux    • Hig extremity ROM is within functional limits     Lower extremity strength is within functional limits except for the following:    Right Knee extension  4/5  Left Knee extension  4/5  Both with pain    BALANCE  Static Sitting: Good  Dynamic Sitting: Fair +  S Gait Assistance: Supervision  Distance (ft): 200  Assistive Device: Rolling walker  Pattern: Shuffle(lateral sway, gaze on floor)  Stoop/Curb Assistance: Not tested  Comment : Based on FIM definitions    Skilled Therapy Provided: Pt approached for therap Decreased activity tolerance, 3) Impaired gait mechanics, and 4) Impaired cardiopulmonary endurance.  Functional outcome measures completed include The AM-PAC '6-Clicks' Inpatient Basic Mobility Short Form was completed and this patient is demonstrating a 3

## 2025-06-26 ENCOUNTER — HOSPITAL ENCOUNTER (EMERGENCY)
Facility: HOSPITAL | Age: 89
Discharge: HOME | End: 2025-06-26
Attending: EMERGENCY MEDICINE
Payer: MEDICAID

## 2025-06-26 VITALS
WEIGHT: 173.06 LBS | BODY MASS INDEX: 30 KG/M2 | OXYGEN SATURATION: 100 % | DIASTOLIC BLOOD PRESSURE: 64 MMHG | HEART RATE: 64 BPM | TEMPERATURE: 99 F | RESPIRATION RATE: 18 BRPM | SYSTOLIC BLOOD PRESSURE: 118 MMHG

## 2025-06-26 DIAGNOSIS — B02.9 HERPES ZOSTER WITHOUT COMPLICATION: Primary | ICD-10-CM

## 2025-06-26 PROCEDURE — 99284 EMERGENCY DEPT VISIT MOD MDM: CPT

## 2025-06-26 PROCEDURE — 99283 EMERGENCY DEPT VISIT LOW MDM: CPT

## 2025-06-26 RX ORDER — VALACYCLOVIR HYDROCHLORIDE 1 G/1
1000 TABLET, FILM COATED ORAL 3 TIMES DAILY
Qty: 21 TABLET | Refills: 0 | Status: SHIPPED | OUTPATIENT
Start: 2025-06-26 | End: 2025-07-03

## 2025-06-26 NOTE — ED INITIAL ASSESSMENT (HPI)
Pt to ER for burning blisters to the chest and back. Pt sts they first appeared on chest 2 days ago.

## 2025-06-26 NOTE — ED PROVIDER NOTES
Patient Seen in: [facility] Emergency Department        History  Chief Complaint   Patient presents with    Rash Skin Problem     Stated Complaint: Poss shingles, has rash to back and chest, burning sensation, pain    Subjective:   HPI            Patient is an 89-year-old foreign language speaking male presents to ED for evaluation of a rash.  Rash obtained from his daughter per patient preference.  States rash developed 2 days ago with pain.  Painful rash to the back and chest.  No history of shingles in the past.  No other complaints      Objective:     Past Medical History:    Arrhythmia    Bell's palsy    Cancer (HCC)    skin cancer on top of head, removed     Coronary atherosclerosis    Esophageal reflux    High blood pressure    High cholesterol    Hypertriglyceridemia    Osteoarthritis    Peripheral vascular disease    Pneumonia due to organism    Shortness of breath    Type II or unspecified type diabetes mellitus without mention of complication, not stated as uncontrolled    Unspecified essential hypertension              Past Surgical History:   Procedure Laterality Date    Cath percutaneous  transluminal coronary angioplasty  1998    Hernia surgery                  No pertinent social history.                              Physical Exam    ED Triage Vitals [06/26/25 0026]   BP 98/63   Pulse 65   Resp 18   Temp 99.2 °F (37.3 °C)   Temp src Oral   SpO2 95 %   O2 Device None (Room air)       Current Vitals:   Vital Signs  BP: 98/63  Pulse: 65  Resp: 18  Temp: 99.2 °F (37.3 °C)  Temp src: Oral  MAP (mmHg): 75    Oxygen Therapy  SpO2: 95 %  O2 Device: None (Room air)            Physical Exam  Stational: Well-appearing in no acute distress  Skin: Patient has a vesicular rash noted to his back and chest.  Pictures noted below              ED Course  Labs Reviewed - No data to display                         MDM     Patient is an 89-year-old male presents to ED for evaluation of a painful rash.   Differential shingles, cellulitis.  Symptoms consistent with shingles.  Patient was placed on Valtrex.  Tylenol for pain.  Follow-up with primary physician as needed.    Independent source(s) of information include daughter    Patient was screened and evaluated during this visit.   As a treating physician attending to the patient, I determined, within reasonable clinical confidence and prior to discharge, that an emergency medical condition was not or was no longer present.  There was no indication for further evaluation, treatment or admission on an emergency basis.  Comprehensive verbal and written discharge and follow-up instructions were provided to help prevent relapse or worsening.  Patient was instructed to follow-up with her primary care provider for further evaluation and treatment, but to return immediately to the ER for worsening, concerning, new, changing or persisting symptoms.  I discussed the case with the patient and they had no questions, complaints, or concerns.  Patient felt comfortable going home.            MDM    Disposition and Plan     Clinical Impression:  1. Herpes zoster without complication         Disposition:  Discharge  6/26/2025  1:19 am    Follow-up:  Delfino Quiroz  [address] [id number] [phone]    Follow up  As needed          Medications Prescribed:  Current Discharge Medication List        START taking these medications    Details   valACYclovir 1 G Oral Tab Take 1 tablet (1,000 mg total) by mouth 3 (three) times daily for 7 days.  Qty: 21 tablet, Refills: 0                   Supplementary Documentation:

## (undated) NOTE — MR AVS SNAPSHOT
[facility],[address], [address] [place] [address] Rd  [phone]               Thank you for choosing us for your health care visit with [place], RN,CDE.   We are glad to serve you and happy to provide you with training will begin this appointment. Educational needs will be discussed at that time. STEP ONE SATISFIER This is a 1-hour appointment taught by a registered dietitian and diabetes educator.  Half of this appointment time is dedicated to the development o Diclofenac-Misoprostol 75-0.2 MG Tbec   Take 1 tablet by mouth daily.    Commonly known as:  ARTHROTEC           Enalapril Maleate 20 MG Tabs   1 TABLET BID   Commonly known as:  VASOTEC           * Insulin Syringe-Needle U-100 31G X 5/16\" 1 ML Misc   1 i active are less likely to develop some chronic diseases than adults who are inactive.      HOW TO GET STARTED: HOW TO STAY MOTIVATED:   Start activities slowly and build up over time Do what you like   Get your heart pumping – brisk walking, biking, swimmin

## (undated) NOTE — LETTER
[address], [address] [facility], [facility] [address]  [address], [address] [place] [address]   [phone] [address] [facility], [address] [place] [address], [address] [place] [address]   [phone]       January 9, 2020    Susi Garcia

## (undated) NOTE — Clinical Note
Your patient was recently seen at the [facility] for a hospital follow-up visit. The visit note is attached. Please contact the clinic with any questions at [phone].     Thank you,  SEGUNDO Cain

## (undated) NOTE — MR AVS SNAPSHOT
[facility],[address], [address] [place] [address] Rd  [phone]               Thank you for choosing us for your health care visit with Mishel Zhou, RN,CDE.   We are glad to serve you and happy to provide you with under the age of 6. ARRIVAL TIME Patients should arrive 10 minutes prior to their appointment time.   INSURANCE QUESTIONS Please bring your insurance card, 's license/ID, the order for education from your doctor, any pertinent lab results and a list Commonly known as:  GLUCOPHAGE           nitroGLYCERIN 0.4 MG/HR Pt24   1 PATCH DAILY-OFF IN PM   Commonly known as:  [id number]           * Notice: This list has 2 medication(s) that are the same as other medications prescribed for you.  Read the directions